# Patient Record
Sex: FEMALE | Race: WHITE | Employment: FULL TIME | URBAN - METROPOLITAN AREA
[De-identification: names, ages, dates, MRNs, and addresses within clinical notes are randomized per-mention and may not be internally consistent; named-entity substitution may affect disease eponyms.]

---

## 2019-04-16 ENCOUNTER — HOSPITAL ENCOUNTER (OUTPATIENT)
Dept: MAMMOGRAPHY | Age: 49
Discharge: HOME | End: 2019-04-16
Attending: FAMILY MEDICINE
Payer: COMMERCIAL

## 2019-04-16 DIAGNOSIS — Z12.31 VISIT FOR SCREENING MAMMOGRAM: ICD-10-CM

## 2019-04-16 PROCEDURE — 77063 BREAST TOMOSYNTHESIS BI: CPT

## 2019-05-29 PROBLEM — N32.81 OAB (OVERACTIVE BLADDER): Status: ACTIVE | Noted: 2019-05-29

## 2019-05-29 PROBLEM — N39.46 MIXED STRESS AND URGE URINARY INCONTINENCE: Status: ACTIVE | Noted: 2019-05-29

## 2019-05-29 PROBLEM — N39.3 SUI (STRESS URINARY INCONTINENCE, FEMALE): Status: ACTIVE | Noted: 2019-05-29

## 2019-09-03 ENCOUNTER — HOSPITAL ENCOUNTER (OUTPATIENT)
Dept: PHYSICAL THERAPY | Age: 49
Discharge: HOME | End: 2019-09-03
Attending: OBSTETRICS & GYNECOLOGY
Payer: COMMERCIAL

## 2019-09-03 DIAGNOSIS — N32.81 OAB (OVERACTIVE BLADDER): ICD-10-CM

## 2019-09-03 DIAGNOSIS — N39.3 SUI (STRESS URINARY INCONTINENCE, FEMALE): ICD-10-CM

## 2019-09-03 PROCEDURE — 97161 PT EVAL LOW COMPLEX 20 MIN: CPT

## 2019-09-03 NOTE — PROGRESS NOTES
Kaveh Sterling  : 1970  Payor: Rhona Medina / Plan[place] [facility] / Product Type: PPO /  [address]  at [facility]  [address] [place] [address]  [phone]   [phone]        OUTPATIENT PHYSICAL THERAPY: Daily Treatment Note 9/3/2019  Visit Count:  1      Pre-treatment Symptoms/Complaints:  Pt is interested in learning how PT can help her symptoms. Pain: Initial: Pain Intensity 1: 0 /10 Post Session:  0/10   Medications Last Reviewed:  9/3/2019  Updated Objective Findings:  See evaluation note from today  TREATMENT:       THERAPEUTIC EXERCISE: (6 minutes):  Exercises per grid below to improve mobility, strength and coordination. Required moderate visual, verbal and tactile cues to promote proper performance. Progressed resistance, range and repetitions as indicated. Date:  9/3/2019     Activity/Exercise Parameters   HEP E/C of the Core with Breathing x 10-15; 3 x daily   Patient Education Role of Core, Management of IAP                         Treatment/Session Summary:    · Response to Treatment:  Pt is a good candidate for skilled PT.  · Communication/Consultation:  None today  · Equipment provided today:  None today  · Recommendations/Intent for next treatment session: Next visit will focus on semg, progress/initiate movement based exercises.     Total Treatment Billable Duration:  6 minutes  PT Patient Time In/Time Out  Time In:   Time Out: [facility] [address], PT    Future Appointments   Date Time Provider Estefany Ferro   2019  9:30 AM Maki Richmond, PT [facility]   2019  9:30 AM Beth Guevara, PT [facility]   10/1/2019  9:30 AM Maki Richmond, PT [facility]   10/8/2019  9:30 AM Maki Richmond, PT [facility]   10/15/2019  9:30 AM Wes MOSER PT [facility]   10/28/2019  3:00 PM Radha Elder DO [facility]   2019  7:30 AM HTF LAB RESOURCE [facility]   2020  8:30 AM F LAB RESOURCE [facility] HTF HTF   8/26/2020  1:15 PM Ewa Rodriguez MD [facility] HTF HTF

## 2019-09-03 NOTE — THERAPY EVALUATION
Bing Harper  : 1970  Payor: Mirian Lee / Plan: [facility] / Product Type: PPO /  [id number]  at [facility]  [address] [place] [address]  [phone]   [phone]          OUTPATIENT PHYSICAL THERAPY:Initial Assessment 9/3/2019   ICD-10: Treatment Diagnosis: lack of coordination (R27.8); generalized weakness (M62.81); stress urinary incontinence (TREVA) (N39.3)  Precautions/Allergies:   Penicillins and Sulfa (sulfonamide antibiotics)   TREATMENT PLAN:  Effective Dates: 9/3/2019 TO 2019 (60 days). Frequency/Duration: 1 time a week for 60 Day(s) MEDICAL/REFERRING DIAGNOSIS:  OAB (overactive bladder) [N32.81]  TREVA (stress urinary incontinence, female) [N39.3]   DATE OF ONSET: Chronic, 3 vaginal deliveries (25, 21 and 18 yo)  REFERRING PHYSICIAN: Stephanie Lopez DO  MD Orders: Eval and Treat  Return MD Appointment: TBD     INITIAL ASSESSMENT:  Ms. Lul Giron demonstrates an uncoordinated PFM group, as well as a lack of coordination and awareness of core expansion/compression with breathing. With maximal verbal instruction and SEMG, this improved and patient able to demonstrate N core activation and appropriate PFM contraction with breathing to decrease IAP resulting in UI. She will benefit from skilled PT with an emphasis on education, biofeedback, muscle retraining and manual therapy to improve coordination and timing and decrease UI with ADL's and recreation. PROBLEM LIST (Impacting functional limitations):  1. Decreased Strength  2. Decreased ADL/Functional Activities  3. Decreased Activity Tolerance  4. Decreased [place] with Home Exercise Program INTERVENTIONS PLANNED: (Treatment may consist of any combination of the following)  1. Electrical Stimulation  2. Home Exercise Program (HEP)  3. Manual Therapy  4. Therapeutic Exercise/Strengthening  5.  Transcutaneous Electrical Nerve Stimulation (TENS)     GOALS: (Goals have been discussed and agreed upon with patient.)  Short-Term Functional Goals: Time Frame: 30 days  Pt will demonstrate I with basic PFM HEP to improve awareness, coordination, and timing of PFM. Pt will increase water intake by 16 oz and decrease irritant consumption by 8 oz to improve bladder health and decrease UI. Discharge Goals: Time Frame: 60 days  1. Pt will demonstrate 10 quick flicks of the pelvic floor muscle group, without compensation, to implement urge suppression appropriately with urgency of urination and decrease the number of pads used per day. 2. Patient will incorporate a voiding schedule and urge strategies into her daily routine to manage urgency and frequency symptoms and decreased urinary frequency to 10x/day or less. 3. Patient will demonstrate N expansion and compression of the core with breathing to decrease urinary incontinence with physical activity. OUTCOME MEASURE:   Urinary Incontinence IMpact Questionaire: 38% Impaired  MEDICAL NECESSITY:   · Patient is expected to demonstrate progress in strength, coordination and functional technique to decrease UI with ADLs and recreation. REASON FOR SERVICES/OTHER COMMENTS:  · Patient continues to require skilled intervention due to below mentioned deficits. Total Duration:  PT Patient Time In/Time Out  Time In: 0937  Time Out: 1032    Rehabilitation Potential For Stated Goals: Good       PAIN/SUBJECTIVE:   Initial: Pain Intensity 1: 0 /10 Post Session:  0/10   HISTORY:   History of Injury/Illness (Reason for Referral):  Urinary: Daily TREVA and UUI, resulting in 1-2 PPD (depending of if she chooses to exercise or not). Cannot run, perform plyometric exercises or jump without immediate TREVA. Saturated pad with activity. Fluid intake: 2 cups of coffee, 20-32 oz H2O x 2, alcohol on Friday evenings. Bowel: Generally only having a BM every few days, no straining, but stool is dehydrated and hard. Sexual: No pain reported.   Pelvic Organ Prolapse/Pelvic Pain: denies POP or pelvic pain    Past Medical History/Comorbidities:   Ms. Ezzie Soulier  has a past medical history of Constipation, Hypercholesterolemia, and Migraine. Ms. Ezzie Soulier  has a past surgical history that includes hx colonoscopy (2012) and hx gyn. Social History/Living Environment:     Lives with  and 2 of her sons  Prior Level of Function/Work/Activity:  Works from Home at the [employer]     Ambulatory/Rehab [address] [place] [address] Risk Assessment   Risk Factors:       No Risk Factors Identified Ability to Rise from Chair:       (0)  Ability to rise in a single movement   Falls Prevention Plan:       No modifications necessary   Total: (5 or greater = High Risk): 0   ©2010 [facility] [employer] [facility] States Patent #[id number]. Federal Law prohibits the replication, distribution or use without written permission from [facility] [employer]   Current Medications:       Current Outpatient Medications:     trospium (SANCTURA XL) 60 mg capsule, Take 1 Cap by mouth Daily (before breakfast). , Disp: 90 Cap, Rfl: 3    pseudoephedrine (SUDAFED) 30 mg tablet, Take  by mouth every four (4) hours as needed for Congestion. , Disp: , Rfl:     loratadine (CLARITIN) 10 mg tablet, Take 10 mg by mouth., Disp: , Rfl:    Date Last Reviewed:  9/3/2019     Number of Personal Factors/Comorbidities that affect the Plan of Care: 0: LOW COMPLEXITY   EXAMINATION:   Palpation:    Via vaginal canal: Nontender superficial PFM    Selective Functional Movement Assessment (Top Tier): FN- Functional Non-painful, FP- Functional Painful, DN- Dysfunctional Non-Painful, DP- Dysfunctional Painful  Multi-Segmental Flexion: FN  Multi-Segmental Extension: DN  Multi-Segmental Rotation: L- FN R- FN  Single Leg Stance: FN, FN    Strength:    PFM via vaginal canal:  P: Power, E: Endurance, R: Repetitions, QF: Quick Flicks, TrA: Transverse Abdominus, DB: Diaphragmatic Breathing  P 4/5   E Limited due to breath holding, but 3 seconds with exhalation   R 5   QF Unable to coordinate   TrA Fair with strong PFM co contraction; but breath holding noted. DB Impaired, chest breathing     PFM via SEMG: Resting tone 1.25-1.5 mV. Contraction > 50 mV    Gluteus Medius: 3+/5 (TFL Compensation)      Body Structures Involved:  1. Muscles Body Functions Affected:  1. Genitourinary  2. Neuromusculoskeletal  3. Movement Related Activities and Participation Affected:  1. General Tasks and Demands  2. Mobility  3.  Self Care   Number of elements (examined above) that affect the Plan of Care: 1-2: LOW COMPLEXITY   CLINICAL PRESENTATION:   Presentation: Stable and uncomplicated: LOW COMPLEXITY   CLINICAL DECISION MAKING:   Use of outcome tool(s) and clinical judgement create a POC that gives a: Clear prediction of patient's progress: LOW COMPLEXITY

## 2019-09-16 ENCOUNTER — HOSPITAL ENCOUNTER (OUTPATIENT)
Dept: PHYSICAL THERAPY | Age: 49
Discharge: HOME | End: 2019-09-16
Attending: OBSTETRICS & GYNECOLOGY
Payer: COMMERCIAL

## 2019-09-16 PROCEDURE — 97110 THERAPEUTIC EXERCISES: CPT

## 2019-09-16 NOTE — PROGRESS NOTES
Manda Dandy  : 1970  Payor: Pheobe Cabot / Plan: [facility] / Product Type: PPO /  [address]  at [facility]  [address] [place] [address]  [phone]   [phone]        OUTPATIENT PHYSICAL THERAPY: Daily Treatment Note 2019  Visit Count:  2      Pre-treatment Symptoms/Complaints: UI is getting better. Significant difficulty doing the breathing    Pain: Initial: Pain Intensity 1: 0 /10 Post Session:  0/10   Medications Last Reviewed:  2019  Updated Objective Findings:  None Today  TREATMENT:       THERAPEUTIC EXERCISE: (45 minutes):  Exercises per grid below to improve mobility, strength and coordination. Required moderate visual, verbal and tactile cues to promote proper performance. Progressed resistance, range and repetitions as indicated. Date:  2019     Activity/Exercise Parameters   HEP E/C of the Core with Breathing x 10-15; 3 x daily  Clam/SLR 1 x 10; 1 x daily   Patient Education Urge suppression, role of core   TrA/PFM Co contraction With SEMG; max instruction for TrA    Kegels Quick Flicks (urge suppression)   SLR With TrA/PFM CO contraction   Bridge With TrA/PFM CO contraction   Sidelying Clamshell With TrA/PFM CO contraction     Treatment/Session Summary:    Response to Treatment: Pt not seen as initially outlined in PT POC due to scheduling conflicts.  signficant difficulty pairing breathing and TrA contraction. slightly improved by end of sessions. TrA is clearly the weaknest link in her kinetic chain, and improving activation of TrA will decrease UI.   · Communication/Consultation:  None today  · Equipment provided today:  None today  · Recommendations/Intent for next treatment session: Next visit will focus on semg, abs/PFM, sitting and standing    Total Treatment Billable Duration:  45 minutes  PT Patient Time In/Time Out  Time In: 935  Time Out: [address], PT    Future Appointments   Date Time Provider Estefany Ferro   9/24/2019  9:30 AM Rodolfo MOSER, PT [facility]   10/1/2019  9:30 AM Jairo Dominique, PT [facility]   10/8/2019  9:30 AM Ilana Hightower, PT [facility]   10/15/2019  9:30 AM Ilana Guevara, PT [facility]   10/28/2019  3:00 PM Tristan Dupont DO [facility]   12/19/2019  7:30 AM HTF LAB RESOURCE [facility] HT[facility]   8/21/2020  8:30 AM HTF LAB RESOURCE [facility] HTF   8/26/2020  1:15 PM Giuliano Rodriguez MD [facility]F HTF

## 2019-09-24 ENCOUNTER — HOSPITAL ENCOUNTER (OUTPATIENT)
Dept: PHYSICAL THERAPY | Age: 49
Discharge: HOME | End: 2019-09-24
Attending: OBSTETRICS & GYNECOLOGY
Payer: COMMERCIAL

## 2019-09-24 PROCEDURE — 97110 THERAPEUTIC EXERCISES: CPT

## 2019-09-24 NOTE — PROGRESS NOTES
Jimmy Morales  : 1970  Payor: Irasema Bray / [place] [facility] / Product Type: PPO /  [address]  at [facility]  [address] [place] [address]  [phone]   [phone]        OUTPATIENT PHYSICAL THERAPY: Daily Treatment Note 2019  Visit Count:  3      Pre-treatment Symptoms/Complaints: UI is still improving. Able to lightly walk fast/jog with heavy feet and no UI. Also, urge suppression seems to be helping as well     Pain: Initial: Pain Intensity 1: 0 /10 Post Session:  0/10   Medications Last Reviewed:  2019  Updated Objective Findings:  None Today  TREATMENT:       THERAPEUTIC EXERCISE: (44 minutes):  Exercises per grid below to improve mobility, strength and coordination. Required moderate visual, verbal and tactile cues to promote proper performance. Progressed resistance, range and repetitions as indicated. Date:  2019     Activity/Exercise Parameters   HEP E/C of the Core with Breathing x 10-15; 3 x daily  Clam/SLR 1 x 10; 1 x daily  Bridge + March, Side plank + clam, LE Lowering, SL Hip Abd/ext/heel raise   Patient Education Running progression   TrA/PFM Co contraction With SEMG; mod instruction for TrA    Nahidgels Quick Flicks (urge suppression); supine, sidelying and SLS   LE Lowering With TrA/PFM CO contraction   Bridge + Marching With TrA/PFM CO contraction   Plank + Sidelying Clamshell With TrA/PFM CO contraction   SL heel Raise, hip abduction, extension With TrA/PFM CO contraction             Treatment/Session Summary:    Response to Treatment:  Progressing very well with activation of tra in various positions. Emphasis today on modifications/progressions of HPE and initiating standing core/ return to running with proper alignment.    · Communication/Consultation:  None today  · Equipment provided today:  None today  · Recommendations/Intent for next treatment session: Next visit will focus on squats, lunges, lateral georges, resisted SLS rotation    Total Treatment Billable Duration:  44 minutes  PT Patient Time In/Time Out  Time In: 0935  Time Out: [address], PT    Future Appointments   Date Time Provider Estefany Ferro   10/1/2019  9:30 AM Kar MOSER, PT [facility]   10/8/2019  9:30 AM Nestor Antonio, PT [facility]   10/15/2019  9:30 AM Kar MOSER, PT [facility]   10/28/2019  3:00 PM Molly Woodall DO [facility]   12/19/2019  7:30 AM HTF LAB RESOURCE [facility]   8/21/2020  8:30 AM HTF LAB RESOURCE [facility] HT[facility]   8/26/2020  1:15 PM Jenna Rodriguez MD [facility] HTF

## 2019-10-01 ENCOUNTER — HOSPITAL ENCOUNTER (OUTPATIENT)
Dept: PHYSICAL THERAPY | Age: 49
Discharge: HOME | End: 2019-10-01
Attending: OBSTETRICS & GYNECOLOGY
Payer: COMMERCIAL

## 2019-10-01 PROCEDURE — 97110 THERAPEUTIC EXERCISES: CPT

## 2019-10-01 NOTE — PROGRESS NOTES
Donna Ancelmo  : 1970  Payor: Lakeisha Bhandari / [place] [facility] / Product Type: PPO /  [address]  at [facility]  [address] [place] [address]  [phone]   [phone]        OUTPATIENT PHYSICAL THERAPY: Daily Treatment Note 10/1/2019  Visit Count:  4      Pre-treatment Symptoms/Complaints: UI keeps getting better. Felt like she was going to leak the whole time she was on the TM yesterday, but didn't. Tweaked her R LB a little while doing exercises at home. Pain: Initial: Pain Intensity 1: 0 /10 Post Session:  0/10   Medications Last Reviewed:  10/1/2019  Updated Objective Findings:  None Today  TREATMENT:       THERAPEUTIC EXERCISE: (43 minutes):  Exercises per grid below to improve mobility, strength and coordination. Required moderate visual, verbal and tactile cues to promote proper performance. Progressed resistance, range and repetitions as indicated. Date:  10/1/2019     Activity/Exercise Parameters   HEP E/C of the Core with Breathing x 10-15; 3 x daily  Clam/SLR 1 x 10; 1 x daily  Bridge + March, Side plank + clam, LE Lowering, SL Hip Abd/ext/heel raise   Patient Education Running progression   TrA/PFM Co contraction With SEMG; mod instruction for TrA    LE Lowering X 1 minute; 1.5# ankle weight B   SLR, HIp Abd,Reverse Clam X 1 minute; 1.5# ankle weight B; ALL   Bridge in ER X 1 minute; 1.5# ankle weight B   Quadruped Hip Extension 9 #; 2 x 10   SL heel Raise, hip abduction, extension With TrA/PFM CO contraction; 1.5# ankle weight   Side stepping with Blue TB 10\" x 6 B   Mini Squats, f/b Sumo Squats X 20   Lateral Bounding X 20 each   Jump into hip abd X 10     Treatment/Session Summary:    Response to Treatment:  No UI with any activities. Difficulty activating glue max/med, but improved by end of session. Initiated plyometrics for HEP tor return to running.   · Communication/Consultation:  None today  · Equipment provided today: None today  · Recommendations/Intent for next treatment session: Next visit will focus on , resisted lateral bounding, resisted SLS rotation    Total Treatment Billable Duration:  43inutes  PT Patient Time In/Time Out  Time In: 0935  Time Out: [address] [address],     Future Appointments   Date Time Provider Estefany Ferro   10/8/2019  9:30 AM Jessica Ward, PT [facility]   10/15/2019  9:30 AM Jodee MOSER PT [facility]   10/28/2019  3:00 PM Leonela Morrow DO [facility]   12/19/2019  7:30 AM HTF LAB RESOURCE [facility]   8/21/2020  8:30 AM HTF LAB RESOURCE [facility]   8/26/2020  1:15 PM Elder Stokes MD [facility] HTF

## 2019-10-08 ENCOUNTER — HOSPITAL ENCOUNTER (OUTPATIENT)
Dept: PHYSICAL THERAPY | Age: 49
Discharge: HOME | End: 2019-10-08
Attending: OBSTETRICS & GYNECOLOGY
Payer: COMMERCIAL

## 2019-10-08 PROCEDURE — 97110 THERAPEUTIC EXERCISES: CPT

## 2019-10-08 NOTE — PROGRESS NOTES
Caleb Mir  : 1970  Payor: Estevan Guevara / [place] [facility] / Product Type: PPO /  [address]  at [facility]  [address] [place] [address]  [phone]   [phone]        OUTPATIENT PHYSICAL THERAPY: Daily Treatment Note 10/8/2019  Visit Count:  5      Pre-treatment Symptoms/Complaints: Bladder is good. Bowels are fine. Haven't had a lot of time to exercise, like plyometrics, but no UI this am with two jumping jacks. Pain: Initial: Pain Intensity 1: 0 /10 Post Session:  0/10   Medications Last Reviewed:  10/8/2019  Updated Objective Findings:  None Today  TREATMENT:       THERAPEUTIC EXERCISE: (45 minutes):  Exercises per grid below to improve mobility, strength and coordination. Required moderate visual, verbal and tactile cues to promote proper performance. Progressed resistance, range and repetitions as indicated. Date:  10/8/2019     Activity/Exercise Parameters   HEP E/C of the Core with Breathing x 10-15; 3 x daily  Clam/SLR 1 x 10; 1 x daily  Bridge + March, Side plank + clam, LE Lowering, SL Hip Abd/ext/heel raise   Patient Education    TrA/PFM Co contraction    LE Lowering X 1 minute; 1.5# ankle weight B   SLR, HIp Abd,Reverse Clam X 1 minute; 1.5# ankle weight B; ALL   Bridge in ER X 1 minute; 1.5# ankle weight B       6\" Step up Green Resistance; 2 minutes B all directions   Resisted Bounding Fwd and Lateral ; 2 minutes all directions B   Mini Squats, f/b Sumo Squats X 20   Lateral Bounding X 20 each   Jump into hip abd X 10     Treatment/Session Summary:    Response to Treatment:  No UI with any activities. Difficulty performing dynamic tasks at first, but improved with cuing. Progressing very well at this time. · Communication/Consultation:  None today  · Equipment provided today:  None today  · Recommendations/Intent for next treatment session: Next visit will focus on Rowing, Sled Push, WAll ball, jog?     Total Treatment Billable Duration:  45 minutes  PT Patient Time In/Time Out  Time In: 0930  Time Out: [address], PT    Future Appointments   Date Time Provider Estefany Kasie   10/15/2019  9:30 AM Ferdinand Mackay PT [facility]   10/28/2019  3:00 PM Basim Franz DO [facility]   12/19/2019  7:30 AM HTF LAB RESOURCE [facility]   8/21/2020  8:30 AM HTF LAB RESOURCE [facility]   8/26/2020  1:15 PM Princess Jairo Rodriguez MD [facility] HTF

## 2019-10-15 ENCOUNTER — HOSPITAL ENCOUNTER (OUTPATIENT)
Dept: PHYSICAL THERAPY | Age: 49
Discharge: HOME | End: 2019-10-15
Attending: OBSTETRICS & GYNECOLOGY
Payer: COMMERCIAL

## 2019-10-15 PROCEDURE — 97110 THERAPEUTIC EXERCISES: CPT

## 2019-10-15 NOTE — PROGRESS NOTES
Nikkie Derek  : 1970  Payor: Aaron Randall / [place] [facility] / Product Type: PPO /  [address] at [facility]  [address] [place] [address]  [phone]   [phone]        OUTPATIENT PHYSICAL THERAPY: Daily Treatment Note 10/15/2019  Visit Count:  6      Pre-treatment Symptoms/Complaints: Needs to [facility] harder\" and perform exercise consistently. But overall bladder is still getting better. m    Pain: Initial: Pain Intensity 1: 0 /10 Post Session:  0/10   Medications Last Reviewed:  10/15/2019  Updated Objective Findings:  None Today  TREATMENT:       THERAPEUTIC EXERCISE: (45 minutes):  Exercises per grid below to improve mobility, strength and coordination. Required moderate visual, verbal and tactile cues to promote proper performance. Progressed resistance, range and repetitions as indicated. Date:  10/15/2019     Activity/Exercise Parameters   HEP E/C of the Core with Breathing x 10-15; 3 x daily  Clam/SLR 1 x 10; 1 x daily  Bridge + March, Side plank + clam, LE Lowering, SL Hip Abd/ext/heel raise   Patient Education    TrA/PFM Co contraction    LE Lowering    SLR, HIp Abd,Reverse Clam X 1 minute; green TB B; ALL   Bridge in ER X 1 minute; green TB   Sled Push 75\" x 6   Lunges 10#; 3 x 10 B   Wall Squats 10#; 2 x 10   Jogging, Shuffling 10 feet; x 3 B   Rower 4 minutes, 2 minutes (level 10         Treatment/Session Summary:    Response to Treatment:  No UI with any activities. Lack of coordination noted and hip weakness with SL tasks. Tried light jogging and shuffling with no UI.     · Communication/Consultation:  None today  · Equipment provided today:  None today  · Recommendations/Intent for next treatment session: Next visit will focus on revisit SEMG, BOSU tasks    Total Treatment Billable Duration:  45 minutes  PT Patient Time In/Time Out  Time In: 930  Time Out: 0821 Jack Alberto, PT    Future Appointments   Date Time Provider Estefany Ferro   10/28/2019  3:00 PM Gm Quijano DO [facility]   11/12/2019  8:00 AM Naye Guevara, [facility]   12/19/2019  7:30 AM HTF LAB RESOURCE [facility] HT[facility]   8/21/2020  8:30 AM HTF LAB RESOURCE [facility] HTF   8/26/2020  1:15 PM Cathy Rodriguez MD [facility] HTF HTF

## 2019-11-12 ENCOUNTER — HOSPITAL ENCOUNTER (OUTPATIENT)
Dept: PHYSICAL THERAPY | Age: 49
Discharge: HOME | End: 2019-11-12
Attending: OBSTETRICS & GYNECOLOGY
Payer: COMMERCIAL

## 2019-11-12 PROCEDURE — 97110 THERAPEUTIC EXERCISES: CPT

## 2019-11-12 NOTE — THERAPY DISCHARGE
Uvaldosonali Roberson  : 1970  Payor: Kaden Earing / Plan: [facility] / Product Type: PPO /  [address]  at [address]  [phone]   [phone]          OUTPATIENT PHYSICAL THERAPY:Recertification and Discharge Summary 2019   ICD-10: Treatment Diagnosis: lack of coordination (R27.8); generalized weakness (M62.81); stress urinary incontinence (TREVA) (N39.3)  Precautions/Allergies:   Penicillins and Sulfa (sulfonamide antibiotics)   TREATMENT PLAN:  Effective Dates: 2019 TO 2019.  ) MEDICAL/REFERRING DIAGNOSIS:  Pelvic floor dysfunction in female [M62.89]   DATE OF ONSET: Chronic, 3 vaginal deliveries (26, 21 and 18 yo)  REFERRING PHYSICIAN: Jazzy Bello DO  MD Orders: Eval and Treat  Return MD Appointment: TBD     DISCHARGE 2019:  Patient has been seen in skilled PT from 9/3/2019 to 2019, a total of 7 visits. Treatment has emphasized manual therapy, education, and exercise. Patient responded well to therapy, with improvements in UI. She has achieved her goals and all questions have been answered to her satisfaction. INITIAL ASSESSMENT:  Ms. Sailaja Dan demonstrates an uncoordinated PFM group, as well as a lack of coordination and awareness of core expansion/compression with breathing. With maximal verbal instruction and SEMG, this improved and patient able to demonstrate N core activation and appropriate PFM contraction with breathing to decrease IAP resulting in UI. She will benefit from skilled PT with an emphasis on education, biofeedback, muscle retraining and manual therapy to improve coordination and timing and decrease UI with ADL's and recreation. PROBLEM LIST (Impacting functional limitations):  1. Decreased Strength  2. Decreased ADL/Functional Activities  3. Decreased Activity Tolerance  4.  Decreased [place] with Home Exercise Program INTERVENTIONS PLANNED: (Treatment may consist of any combination of the following)  1. Electrical Stimulation  2. Home Exercise Program (HEP)  3. Manual Therapy  4. Therapeutic Exercise/Strengthening  5. Transcutaneous Electrical Nerve Stimulation (TENS)     GOALS: (Goals have been discussed and agreed upon with patient.)  Short-Term Functional Goals: Time Frame: 30 days  Pt will demonstrate I with basic PFM HEP to improve awareness, coordination, and timing of PFM. MET  Pt will increase water intake by 16 oz and decrease irritant consumption by 8 oz to improve bladder health and decrease UI. MET    Discharge Goals: Time Frame: 60 days  1. Pt will demonstrate 10 quick flicks of the pelvic floor muscle group, without compensation, to implement urge suppression appropriately with urgency of urination and decrease the number of pads used per day. MET  2. Patient will incorporate a voiding schedule and urge strategies into her daily routine to manage urgency and frequency symptoms and decreased urinary frequency to 10x/day or less. MET  3. Patient will demonstrate N expansion and compression of the core with breathing to decrease urinary incontinence with physical activity. MET    OUTCOME MEASURE:   Urinary Incontinence IMpact Questionaire: 38% Impaired; 11/12: 24% Impaired    Rehabilitation Potential For Stated Goals: Good       PAIN/SUBJECTIVE:   Initial: Pain Intensity 1: 0 /10 Post Session:  0/10   HISTORY:   History of Injury/Illness (Reason for Referral):  Urinary: Daily TREVA and UUI, resulting in 1-2 PPD (depending of if she chooses to exercise or not). Cannot run, perform plyometric exercises or jump without immediate TREVA. Saturated pad with activity. Fluid intake: 2 cups of coffee, 20-32 oz H2O x 2, alcohol on Friday evenings. Bowel: Generally only having a BM every few days, no straining, but stool is dehydrated and hard. Sexual: No pain reported.   Pelvic Organ Prolapse/Pelvic Pain: denies POP or pelvic pain    DISCHARGE/RECERTIFICATION:  Urinary: UI only occurring with plyometric exercises and it is small. She is wearing a pad with dynamic exercises but no other times. No UUI or UI with cough or sneeze in the past several weeks. Bowel: Same frequency, but stool is more soft recently. Sexual: No pain  Pelvic Organ Prolapse/Pelvic Pain: denies      Past Medical History/Comorbidities:   Ms. Cassidy Pringle  has a past medical history of Constipation, Hypercholesterolemia, and Migraine. Ms. Cassidy Pringle  has a past surgical history that includes hx colonoscopy (2012) and hx gyn. Social History/Living Environment:     Lives with  and 2 of her sons  Prior Level of Function/Work/Activity:  Works from Home at the [employer]     [facility] [address] [place] [address] Risk Assessment   Risk Factors:       No Risk Factors Identified Ability to Rise from Chair:       (0)  Ability to rise in a single movement   Falls Prevention Plan:       No modifications necessary   Total: (5 or greater = High Risk): 0   ©2010 [facility] [employer] [facility] States Patent #[id number]. Federal Law prohibits the replication, distribution or use without written permission from [facility] [employer]   Current Medications:       Current Outpatient Medications:     SUMAtriptan (IMITREX) 100 mg tablet, Take one tab po for migraine. May repeat x1 if ineffective. Max 24-hr dosage 200mg., Disp: 9 Tab, Rfl: 6    trospium (SANCTURA XL) 60 mg capsule, Take 1 Cap by mouth Daily (before breakfast). , Disp: 90 Cap, Rfl: 3    pseudoephedrine (SUDAFED) 30 mg tablet, Take  by mouth every four (4) hours as needed for Congestion. , Disp: , Rfl:     loratadine (CLARITIN) 10 mg tablet, Take 10 mg by mouth., Disp: , Rfl:    Date Last Reviewed:  11/12/2019     Number of Personal Factors/Comorbidities that affect the Plan of Care: 0: LOW COMPLEXITY   EXAMINATION:   UPDATED 11/12/2019:  Palpation:    Via vaginal canal: Nontender superficial PFM    Selective Functional Movement Assessment (Top Tier): FN- Functional Non-painful, FP- Functional Painful, DN- Dysfunctional Non-Painful, DP- Dysfunctional Painful  Multi-Segmental Flexion: FN  Multi-Segmental Extension: DN  Multi-Segmental Rotation: L- FN R- FN  Single Leg Stance: FN, FN    Strength:    PFM via vaginal canal:  P: Power, E: Endurance, R: Repetitions, QF: Quick Flicks, TrA: Transverse Abdominus, DB: Diaphragmatic Breathing  P 4/5   E 30 SECONDS   R 5   QF 2 minutes with N relaxation and same power output   TrA Good with strong PFM co contraction and good endurance; no breath holding   DB WNL     PFM via SEMG: Resting tone 1.25-1.5 mV. Contraction > 50 mV    Gluteus Medius: 4/5     Body Structures Involved:  1. Muscles Body Functions Affected:  1. Genitourinary  2. Neuromusculoskeletal  3. Movement Related Activities and Participation Affected:  1. General Tasks and Demands  2. Mobility  3.  Self Care   Number of elements (examined above) that affect the Plan of Care: 1-2: LOW COMPLEXITY   CLINICAL PRESENTATION:   Presentation: Stable and uncomplicated: LOW COMPLEXITY   CLINICAL DECISION MAKING:   Use of outcome tool(s) and clinical judgement create a POC that gives a: Clear prediction of patient's progress: LOW COMPLEXITY

## 2019-11-12 NOTE — PROGRESS NOTES
Carlo Mahmood  : 1970  Payor: Francisco Llamas / Plan: [facility] / Product Type: PPO /  [address]  at [facility]  [address] [place] [address]  [phone]   [phone]        OUTPATIENT PHYSICAL THERAPY: Daily Treatment Note 2019  Visit Count:  7      Pre-treatment Symptoms/Complaints: See DC summary. Pain: Initial: Pain Intensity 1: 0 /10 Post Session:  0/10   Medications Last Reviewed:  2019  Updated Objective Findings:  See DC summary  TREATMENT:       THERAPEUTIC EXERCISE: (45 minutes):  Exercises per grid below to improve mobility, strength and coordination. Required moderate visual, verbal and tactile cues to promote proper performance. Progressed resistance, range and repetitions as indicated. Date:  2019     Activity/Exercise Parameters   HEP E/C of the Core with Breathing x 10-15; 3 x daily  Clam/SLR 1 x 10; 1 x daily  Bridge + March, Side plank + clam, LE Lowering, SL Hip Abd/ext/heel raise   Patient Education Mait Program, Impressa, Urethral Hypermobility, Modifications for DC planning   TrA/PFM Co contraction Supine with SEMG   Kegel Quick flicks; supine and sidelying   SLR X 30 B   Bridge in ER X 30   LE Lowering 30 B   Lunges 10#; 3 x 10 B   Wall Squats 10#; 2 x 10   Jogging, Shuffling 10 feet; x 3 B   Rower 4 minutes, 2 minutes (level 10         Treatment/Session Summary:    Response to Treatment:  Improvements noted in PFM and Tra function. All questions answered to satisfaction and I with HEP. DC at this time.    · Communication/Consultation:  None today  · Equipment provided today:  None today     Total Treatment Billable Duration:  45 minutes  PT Patient Time In/Time Out  Time In: 0815  Time Out: 0900  Melissa Graves, JEREMY    Future Appointments   Date Time Provider Estefany Ferro   2019  2:00 PM Yordy Morgan DO [facility]   2019  7:30 AM [facility] LAB RESOURCE [facility]   2020  8:30 AM HTF LAB RESOURCE [facility]   8/26/2020  1:15 PM Klever Rodriguez MD [facility]

## 2020-11-16 ENCOUNTER — HOSPITAL ENCOUNTER (OUTPATIENT)
Dept: GENERAL RADIOLOGY | Age: 50
Discharge: HOME | End: 2020-11-16
Attending: FAMILY MEDICINE
Payer: COMMERCIAL

## 2020-11-16 DIAGNOSIS — M54.2 NECK PAIN: ICD-10-CM

## 2020-11-16 PROCEDURE — 72040 X-RAY EXAM NECK SPINE 2-3 VW: CPT

## 2021-06-22 ENCOUNTER — TRANSCRIBE ORDER (OUTPATIENT)
Dept: SCHEDULING | Age: 51
End: 2021-06-22

## 2021-06-22 DIAGNOSIS — Z12.31 VISIT FOR SCREENING MAMMOGRAM: Primary | ICD-10-CM

## 2021-07-22 ENCOUNTER — HOSPITAL ENCOUNTER (OUTPATIENT)
Dept: MAMMOGRAPHY | Age: 51
Discharge: HOME | End: 2021-07-22
Attending: FAMILY MEDICINE
Payer: COMMERCIAL

## 2021-07-22 DIAGNOSIS — Z12.31 VISIT FOR SCREENING MAMMOGRAM: ICD-10-CM

## 2021-07-22 PROCEDURE — 77063 BREAST TOMOSYNTHESIS BI: CPT

## 2022-03-19 PROBLEM — N39.3 SUI (STRESS URINARY INCONTINENCE, FEMALE): Status: ACTIVE | Noted: 2019-05-29

## 2022-03-20 PROBLEM — N32.81 OAB (OVERACTIVE BLADDER): Status: ACTIVE | Noted: 2019-05-29

## 2022-08-29 DIAGNOSIS — Z00.00 ANNUAL PHYSICAL EXAM: Primary | ICD-10-CM

## 2022-08-30 ENCOUNTER — NURSE ONLY (OUTPATIENT)
Dept: FAMILY MEDICINE CLINIC | Facility: CLINIC | Age: 52
End: 2022-08-30

## 2022-08-30 DIAGNOSIS — Z00.00 ANNUAL PHYSICAL EXAM: ICD-10-CM

## 2022-08-30 LAB
ALBUMIN SERPL-MCNC: 3.8 G/DL (ref 3.5–5)
ALBUMIN/GLOB SERPL: 1.2 {RATIO} (ref 1.2–3.5)
ALP SERPL-CCNC: 79 U/L (ref 50–136)
ALT SERPL-CCNC: 19 U/L (ref 12–65)
ANION GAP SERPL CALC-SCNC: 4 MMOL/L (ref 7–16)
AST SERPL-CCNC: 12 U/L (ref 15–37)
BILIRUB SERPL-MCNC: 0.7 MG/DL (ref 0.2–1.1)
BUN SERPL-MCNC: 11 MG/DL (ref 6–23)
CALCIUM SERPL-MCNC: 9.4 MG/DL (ref 8.3–10.4)
CHLORIDE SERPL-SCNC: 107 MMOL/L (ref 98–107)
CHOLEST SERPL-MCNC: 241 MG/DL
CO2 SERPL-SCNC: 28 MMOL/L (ref 21–32)
CREAT SERPL-MCNC: 0.7 MG/DL (ref 0.6–1)
GLOBULIN SER CALC-MCNC: 3.3 G/DL (ref 2.3–3.5)
GLUCOSE SERPL-MCNC: 89 MG/DL (ref 65–100)
HDLC SERPL-MCNC: 54 MG/DL (ref 40–60)
HDLC SERPL: 4.5 {RATIO}
LDLC SERPL CALC-MCNC: 156.4 MG/DL
POTASSIUM SERPL-SCNC: 4 MMOL/L (ref 3.5–5.1)
PROT SERPL-MCNC: 7.1 G/DL (ref 6.3–8.2)
SODIUM SERPL-SCNC: 139 MMOL/L (ref 136–145)
TRIGL SERPL-MCNC: 153 MG/DL (ref 35–150)
TSH, 3RD GENERATION: 3.17 UIU/ML (ref 0.36–3.74)
VLDLC SERPL CALC-MCNC: 30.6 MG/DL (ref 6–23)

## 2022-08-31 LAB
APPEARANCE UR: CLEAR
BACTERIA URNS QL MICRO: NEGATIVE /HPF
BASOPHILS # BLD: 0 K/UL (ref 0–0.2)
BASOPHILS NFR BLD: 1 % (ref 0–2)
BILIRUB UR QL: NEGATIVE
CASTS URNS QL MICRO: ABNORMAL /LPF
COLOR UR: ABNORMAL
DIFFERENTIAL METHOD BLD: ABNORMAL
EOSINOPHIL # BLD: 0.1 K/UL (ref 0–0.8)
EOSINOPHIL NFR BLD: 2 % (ref 0.5–7.8)
EPI CELLS #/AREA URNS HPF: ABNORMAL /HPF
ERYTHROCYTE [DISTWIDTH] IN BLOOD BY AUTOMATED COUNT: 13.2 % (ref 11.9–14.6)
GLUCOSE UR STRIP.AUTO-MCNC: NEGATIVE MG/DL
HCT VFR BLD AUTO: 42.9 % (ref 35.8–46.3)
HGB BLD-MCNC: 13.3 G/DL (ref 11.7–15.4)
HGB UR QL STRIP: NEGATIVE
IMM GRANULOCYTES # BLD AUTO: 0 K/UL (ref 0–0.5)
IMM GRANULOCYTES NFR BLD AUTO: 0 % (ref 0–5)
KETONES UR QL STRIP.AUTO: NEGATIVE MG/DL
LEUKOCYTE ESTERASE UR QL STRIP.AUTO: NEGATIVE
LYMPHOCYTES # BLD: 1.8 K/UL (ref 0.5–4.6)
LYMPHOCYTES NFR BLD: 34 % (ref 13–44)
MCH RBC QN AUTO: 30.4 PG (ref 26.1–32.9)
MCHC RBC AUTO-ENTMCNC: 31 G/DL (ref 31.4–35)
MCV RBC AUTO: 97.9 FL (ref 79.6–97.8)
MONOCYTES # BLD: 0.5 K/UL (ref 0.1–1.3)
MONOCYTES NFR BLD: 10 % (ref 4–12)
MUCOUS THREADS URNS QL MICRO: 0 /LPF
NEUTS SEG # BLD: 2.7 K/UL (ref 1.7–8.2)
NEUTS SEG NFR BLD: 53 % (ref 43–78)
NITRITE UR QL STRIP.AUTO: NEGATIVE
NRBC # BLD: 0 K/UL (ref 0–0.2)
PH UR STRIP: 7 [PH] (ref 5–9)
PLATELET # BLD AUTO: 238 K/UL (ref 150–450)
PMV BLD AUTO: 12.3 FL (ref 9.4–12.3)
PROT UR STRIP-MCNC: NEGATIVE MG/DL
RBC # BLD AUTO: 4.38 M/UL (ref 4.05–5.2)
RBC #/AREA URNS HPF: ABNORMAL /HPF
SP GR UR REFRACTOMETRY: <1.005 (ref 1–1.02)
URINE CULTURE IF INDICATED: ABNORMAL
UROBILINOGEN UR QL STRIP.AUTO: 0.2 EU/DL (ref 0.2–1)
WBC # BLD AUTO: 5.2 K/UL (ref 4.3–11.1)
WBC URNS QL MICRO: ABNORMAL /HPF

## 2022-09-06 ENCOUNTER — OFFICE VISIT (OUTPATIENT)
Dept: FAMILY MEDICINE CLINIC | Facility: CLINIC | Age: 52
End: 2022-09-06
Payer: COMMERCIAL

## 2022-09-06 VITALS
SYSTOLIC BLOOD PRESSURE: 110 MMHG | OXYGEN SATURATION: 98 % | HEART RATE: 78 BPM | BODY MASS INDEX: 25.27 KG/M2 | HEIGHT: 64 IN | DIASTOLIC BLOOD PRESSURE: 68 MMHG | WEIGHT: 148 LBS | TEMPERATURE: 97 F

## 2022-09-06 DIAGNOSIS — E78.2 MIXED HYPERLIPIDEMIA: ICD-10-CM

## 2022-09-06 DIAGNOSIS — Z00.00 ANNUAL PHYSICAL EXAM: Primary | ICD-10-CM

## 2022-09-06 DIAGNOSIS — Z12.31 SCREENING MAMMOGRAM, ENCOUNTER FOR: ICD-10-CM

## 2022-09-06 DIAGNOSIS — N32.81 OAB (OVERACTIVE BLADDER): ICD-10-CM

## 2022-09-06 DIAGNOSIS — Z12.11 SCREENING FOR COLON CANCER: ICD-10-CM

## 2022-09-06 DIAGNOSIS — G43.009 MIGRAINE WITHOUT AURA AND WITHOUT STATUS MIGRAINOSUS, NOT INTRACTABLE: ICD-10-CM

## 2022-09-06 PROCEDURE — 99396 PREV VISIT EST AGE 40-64: CPT | Performed by: FAMILY MEDICINE

## 2022-09-06 RX ORDER — TROSPIUM CHLORIDE 20 MG/1
20 TABLET, FILM COATED ORAL 2 TIMES DAILY
Qty: 60 TABLET | Refills: 12 | Status: SHIPPED | OUTPATIENT
Start: 2022-09-06

## 2022-09-06 RX ORDER — SUMATRIPTAN 100 MG/1
100 TABLET, FILM COATED ORAL
Qty: 9 TABLET | Refills: 5 | Status: SHIPPED | OUTPATIENT
Start: 2022-09-06 | End: 2022-09-06

## 2022-09-06 RX ORDER — ROSUVASTATIN CALCIUM 10 MG/1
10 TABLET, COATED ORAL NIGHTLY
Qty: 30 TABLET | Refills: 3 | Status: SHIPPED | OUTPATIENT
Start: 2022-09-06

## 2022-09-06 ASSESSMENT — ENCOUNTER SYMPTOMS
DIARRHEA: 0
CONSTIPATION: 0
ABDOMINAL PAIN: 0
CHEST TIGHTNESS: 0
COUGH: 0
SHORTNESS OF BREATH: 0
SINUS PAIN: 0
EYE DISCHARGE: 0

## 2022-09-06 NOTE — PROGRESS NOTES
Florence Butler is a 47 y.o. female who presents with   Chief Complaint   Patient presents with    Annual Exam       History of Present Illness  Pt for cpx. Migraine 4-5 days per month. Imitrex helps. Stress at work, but overall mood okay. Exercising fairly regularly- walking dog. No cp or sob. No swelling or palpitations. No GI sxs. Urinary sxs controlled with [place]. No periods x over 1 year. . FH mother with hyperlipidemia and MGM with MI in [de-identified]. Review of Systems  Review of Systems   Constitutional:  Negative for appetite change, fatigue and fever. HENT:  Negative for congestion, ear pain and sinus pain. Eyes:  Negative for discharge. Respiratory:  Negative for cough, chest tightness and shortness of breath. Cardiovascular:  Negative for chest pain, palpitations and leg swelling. Gastrointestinal:  Negative for abdominal pain, constipation and diarrhea. Genitourinary:  Negative for dysuria. Musculoskeletal:  Negative for joint swelling. Skin:  Negative for rash. Neurological:  Positive for headaches. Hematological:  Negative for adenopathy. Psychiatric/Behavioral:  Negative for dysphoric mood. The patient is not nervous/anxious. Medications  Current Outpatient Medications   Medication Sig Dispense Refill    trospium (SANCTURA) 20 MG tablet Take 1 tablet by mouth 2 times daily 60 tablet 12    SUMAtriptan (IMITREX) 100 MG tablet Take 1 tablet by mouth once as needed for Migraine Take one tab po for migraine. May repeat x1 if ineffective. Max 24-hr dosage 200mg.  9 tablet 5    rosuvastatin (CRESTOR) 10 MG tablet Take 1 tablet by mouth nightly 30 tablet 3    loratadine (CLARITIN) 10 MG tablet Take 10 mg by mouth      pseudoephedrine (SUDAFED) 30 MG tablet Take by mouth every 4 hours as needed      clotrimazole-betamethasone (LOTRISONE) 1-0.05 % cream Apply topically 2 times daily (Patient not taking: Reported on 9/6/2022)      terbinafine (LAMISIL) 250 MG tablet Take 250 mg by mouth daily (Patient not taking: Reported on 9/6/2022)      trospium (SANCTURA) 60 MG CP24 extended release capsule Take 60 mg by mouth every morning (before breakfast) (Patient not taking: Reported on 9/6/2022)       No current facility-administered medications for this visit.         Past Medical History  Past Medical History:   Diagnosis Date    Constipation     Hypercholesterolemia     Migraine        Surgical History  Past Surgical History:   Procedure Laterality Date    COLONOSCOPY  2012    GYN      D&C after miscarriage          Family History  Family History   Problem Relation Age of Onset    Breast Cancer Maternal Grandmother         62s    Cancer Father         skin    Asthma Sister     Bipolar Disorder Brother     No Known Problems Brother     Bleeding Prob Maternal Grandmother         breast    Osteoarthritis Mother     Elevated Lipids Mother     No Known Problems Brother        Social History  Social History     Socioeconomic History    Marital status:      Spouse name: Not on file    Number of children: Not on file    Years of education: Not on file    Highest education level: Not on file   Occupational History    Not on file   Tobacco Use    Smoking status: Former    Smokeless tobacco: Never   Substance and Sexual Activity    Alcohol use: Yes    Drug use: No    Sexual activity: Not on file   Other Topics Concern    Not on file   Social History Narrative    Not on file     Social Determinants of Health     Financial Resource Strain: Not on file   Food Insecurity: Not on file   Transportation Needs: Not on file   Physical Activity: Not on file   Stress: Not on file   Social Connections: Not on file   Intimate Partner Violence: Not on file   Housing Stability: Not on file       Social History     Tobacco Use   Smoking Status Former   Smokeless Tobacco Never       Allergies  Allergies   Allergen Reactions    Penicillins Rash    Sulfa Antibiotics Rash       Vital Signs  Body mass index is 25.4 kg/m². Vitals:    09/06/22 0759   BP: 110/68   Pulse: 78   Temp: 97 °F (36.1 °C)   TempSrc: Temporal   SpO2: 98%   Weight: 148 lb (67.1 kg)   Height: 5' 4\" (1.626 m)       Physical Exam  Physical Exam  Vitals reviewed. Constitutional:       Appearance: Normal appearance. HENT:      Head: Normocephalic. Right Ear: Tympanic membrane normal.      Left Ear: Tympanic membrane normal.      Nose: No congestion. Mouth/Throat:      Pharynx: No oropharyngeal exudate or posterior oropharyngeal erythema. Eyes:      Extraocular Movements: Extraocular movements intact. Conjunctiva/sclera: Conjunctivae normal.      Pupils: Pupils are equal, round, and reactive to light. Neck:      Vascular: No carotid bruit. Cardiovascular:      Rate and Rhythm: Normal rate and regular rhythm. Pulses: Normal pulses. Heart sounds: No murmur heard. Pulmonary:      Effort: Pulmonary effort is normal.      Breath sounds: Normal breath sounds. No wheezing. Chest:   Breasts:     Breasts are symmetrical.      Right: No swelling, bleeding, inverted nipple, mass, nipple discharge, skin change or tenderness. Left: No swelling, bleeding, inverted nipple, mass, nipple discharge, skin change or tenderness. Abdominal:      General: Bowel sounds are normal. There is no distension. Palpations: Abdomen is soft. There is no mass. Tenderness: There is no abdominal tenderness. Hernia: No hernia is present. Genitourinary:     General: Normal vulva. Vagina: No vaginal discharge. Musculoskeletal:         General: No tenderness. Cervical back: Normal range of motion. Right lower leg: No edema. Left lower leg: No edema. Lymphadenopathy:      Cervical: No cervical adenopathy. Skin:     General: Skin is warm and dry. Findings: No rash. Neurological:      General: No focal deficit present. Mental Status: She is alert and oriented to person, place, and time. Psychiatric:         Mood and Affect: Mood normal.         Thought Content: Thought content normal.        Assessment and Plan  Lisa TURNER was seen today for annual exam.    Diagnoses and all orders for this visit:    Annual physical exam    Migraine without aura and without status migrainosus, not intractable  -     SUMAtriptan (IMITREX) 100 MG tablet; Take 1 tablet by mouth once as needed for Migraine Take one tab po for migraine. May repeat x1 if ineffective. Max 24-hr dosage 200mg. OAB (overactive bladder)  -     trospium (SANCTURA) 20 MG tablet; Take 1 tablet by mouth 2 times daily    Mixed hyperlipidemia  -     rosuvastatin (CRESTOR) 10 MG tablet; Take 1 tablet by mouth nightly  -     Lipid Panel; Future  -     Hepatic Function Panel; Future    Screening for colon cancer  -     [facility]    Screening mammogram, encounter for  -     [facility]ORENA DIGITAL SCREEN BILATERAL;  Future  Add Crestor  Recheck lipids and LFTs in 6 weeks  Diet/exercise  Mammogram/ colonoscopy

## 2022-09-23 ENCOUNTER — HOSPITAL ENCOUNTER (OUTPATIENT)
Dept: MAMMOGRAPHY | Age: 52
Discharge: HOME | End: 2022-09-26
Payer: COMMERCIAL

## 2022-09-23 DIAGNOSIS — Z12.31 SCREENING MAMMOGRAM, ENCOUNTER FOR: ICD-10-CM

## 2022-09-23 PROCEDURE — 77063 BREAST TOMOSYNTHESIS BI: CPT

## 2022-12-22 DIAGNOSIS — N32.81 OAB (OVERACTIVE BLADDER): ICD-10-CM

## 2022-12-22 RX ORDER — TROSPIUM CHLORIDE 20 MG/1
TABLET, FILM COATED ORAL
Qty: 180 TABLET | Refills: 3 | Status: SHIPPED | OUTPATIENT
Start: 2022-12-22

## 2023-01-18 DIAGNOSIS — E78.2 MIXED HYPERLIPIDEMIA: ICD-10-CM

## 2023-01-18 RX ORDER — ROSUVASTATIN CALCIUM 10 MG/1
TABLET, COATED ORAL
Qty: 30 TABLET | Refills: 3 | Status: SHIPPED | OUTPATIENT
Start: 2023-01-18

## 2023-07-27 ENCOUNTER — OFFICE VISIT (OUTPATIENT)
Dept: FAMILY MEDICINE CLINIC | Facility: CLINIC | Age: 53
End: 2023-07-27
Payer: COMMERCIAL

## 2023-07-27 VITALS
BODY MASS INDEX: 25.78 KG/M2 | SYSTOLIC BLOOD PRESSURE: 104 MMHG | WEIGHT: 151 LBS | OXYGEN SATURATION: 99 % | DIASTOLIC BLOOD PRESSURE: 66 MMHG | TEMPERATURE: 97.7 F | HEART RATE: 83 BPM | HEIGHT: 64 IN

## 2023-07-27 DIAGNOSIS — H65.92 LEFT SEROUS OTITIS MEDIA, UNSPECIFIED CHRONICITY: ICD-10-CM

## 2023-07-27 DIAGNOSIS — Z12.11 COLON CANCER SCREENING: Primary | ICD-10-CM

## 2023-07-27 PROCEDURE — 99213 OFFICE O/P EST LOW 20 MIN: CPT | Performed by: FAMILY MEDICINE

## 2023-07-27 RX ORDER — PREDNISONE 10 MG/1
10 TABLET ORAL 2 TIMES DAILY
Qty: 10 TABLET | Refills: 0 | Status: SHIPPED | OUTPATIENT
Start: 2023-07-27 | End: 2023-08-01

## 2023-07-27 SDOH — ECONOMIC STABILITY: HOUSING INSECURITY
IN THE LAST 12 MONTHS, WAS THERE A TIME WHEN YOU DID NOT HAVE A STEADY PLACE TO SLEEP OR SLEPT IN A SHELTER (INCLUDING NOW)?: NO

## 2023-07-27 SDOH — ECONOMIC STABILITY: FOOD INSECURITY: WITHIN THE PAST 12 MONTHS, THE FOOD YOU BOUGHT JUST DIDN'T LAST AND YOU DIDN'T HAVE MONEY TO GET MORE.: NEVER TRUE

## 2023-07-27 SDOH — ECONOMIC STABILITY: FOOD INSECURITY: WITHIN THE PAST 12 MONTHS, YOU WORRIED THAT YOUR FOOD WOULD RUN OUT BEFORE YOU GOT MONEY TO BUY MORE.: NEVER TRUE

## 2023-07-27 SDOH — ECONOMIC STABILITY: INCOME INSECURITY: HOW HARD IS IT FOR YOU TO PAY FOR THE VERY BASICS LIKE FOOD, HOUSING, MEDICAL CARE, AND HEATING?: NOT HARD AT ALL

## 2023-07-27 ASSESSMENT — ENCOUNTER SYMPTOMS
SINUS PAIN: 0
SHORTNESS OF BREATH: 0
EYE DISCHARGE: 0
COUGH: 0
CONSTIPATION: 0
DIARRHEA: 0
ABDOMINAL PAIN: 0
CHEST TIGHTNESS: 0

## 2023-07-27 ASSESSMENT — PATIENT HEALTH QUESTIONNAIRE - PHQ9
SUM OF ALL RESPONSES TO PHQ9 QUESTIONS 1 & 2: 0
2. FEELING DOWN, DEPRESSED OR HOPELESS: 0
SUM OF ALL RESPONSES TO PHQ QUESTIONS 1-9: 0
SUM OF ALL RESPONSES TO PHQ QUESTIONS 1-9: 0
1. LITTLE INTEREST OR PLEASURE IN DOING THINGS: 0
SUM OF ALL RESPONSES TO PHQ QUESTIONS 1-9: 0
SUM OF ALL RESPONSES TO PHQ QUESTIONS 1-9: 0

## 2023-07-27 NOTE — PROGRESS NOTES
Ade Palomo is a 47 y.o. female who presents with   Chief Complaint   Patient presents with    Ear Fullness     Bilat, L > R, will be flying soon. Long hx of ear issues       History of Present Illness  L ear muffled. No pain. Has chronic Tinnitus. Started a couple months ago after sinus infection. No dizziness. Review of Systems  Review of Systems   Constitutional:  Negative for appetite change, fatigue and fever. HENT:  Negative for congestion, ear pain and sinus pain. Eyes:  Negative for discharge. Respiratory:  Negative for cough, chest tightness and shortness of breath. Cardiovascular:  Negative for chest pain, palpitations and leg swelling. Gastrointestinal:  Negative for abdominal pain, constipation and diarrhea. Genitourinary:  Negative for dysuria. Musculoskeletal:  Negative for joint swelling. Skin:  Negative for rash. Neurological:  Negative for headaches. Hematological:  Negative for adenopathy. Psychiatric/Behavioral:  Negative for dysphoric mood. The patient is not nervous/anxious. Medications  Current Outpatient Medications   Medication Sig Dispense Refill    predniSONE (DELTASONE) 10 MG tablet Take 1 tablet by mouth 2 times daily for 5 days 10 tablet 0    rosuvastatin (CRESTOR) 10 MG tablet TAKE 1 TABLET BY MOUTH EVERY NIGHT 30 tablet 3    trospium (SANCTURA) 20 MG tablet TAKE 1 TABLET BY MOUTH TWICE DAILY 180 tablet 3    loratadine (CLARITIN) 10 MG tablet Take 1 tablet by mouth      SUMAtriptan (IMITREX) 100 MG tablet Take 1 tablet by mouth once as needed for Migraine Take one tab po for migraine. May repeat x1 if ineffective. Max 24-hr dosage 200mg. 9 tablet 5    clotrimazole-betamethasone (LOTRISONE) 1-0.05 % cream Apply topically 2 times daily (Patient not taking: Reported on 9/6/2022)       No current facility-administered medications for this visit.         Past Medical History  Past Medical History:   Diagnosis Date    Constipation

## 2023-08-03 DIAGNOSIS — G43.009 MIGRAINE WITHOUT AURA AND WITHOUT STATUS MIGRAINOSUS, NOT INTRACTABLE: ICD-10-CM

## 2023-08-04 RX ORDER — SUMATRIPTAN 100 MG/1
TABLET, FILM COATED ORAL
Qty: 9 TABLET | Refills: 5 | Status: SHIPPED | OUTPATIENT
Start: 2023-08-04

## 2023-08-07 ENCOUNTER — CLINICAL DOCUMENTATION (OUTPATIENT)
Dept: SURGERY | Age: 53
End: 2023-08-07

## 2023-08-07 NOTE — PROGRESS NOTES
Referral received for routine colonoscopy- screening or surveillance only. Chart reviewed by me on August 7, 2023. Did not schedule w/referral in 21 or to GI in 22.   Hx indicates prior exam 2012    Pt found to be acceptable candidate for direct scheduling if they are interested with the following special instructions (if any):

## 2023-08-30 DIAGNOSIS — E78.2 MIXED HYPERLIPIDEMIA: ICD-10-CM

## 2023-08-30 DIAGNOSIS — Z00.00 ENCOUNTER FOR GENERAL ADULT MEDICAL EXAMINATION WITHOUT ABNORMAL FINDINGS: Primary | ICD-10-CM

## 2023-08-31 ENCOUNTER — NURSE ONLY (OUTPATIENT)
Dept: FAMILY MEDICINE CLINIC | Facility: CLINIC | Age: 53
End: 2023-08-31

## 2023-08-31 DIAGNOSIS — E78.2 MIXED HYPERLIPIDEMIA: ICD-10-CM

## 2023-08-31 DIAGNOSIS — Z00.00 ENCOUNTER FOR GENERAL ADULT MEDICAL EXAMINATION WITHOUT ABNORMAL FINDINGS: ICD-10-CM

## 2023-08-31 LAB
ALBUMIN SERPL-MCNC: 3.9 G/DL (ref 3.5–5)
ALBUMIN/GLOB SERPL: 1.4 (ref 0.4–1.6)
ALP SERPL-CCNC: 73 U/L (ref 50–136)
ALT SERPL-CCNC: 23 U/L (ref 12–65)
ANION GAP SERPL CALC-SCNC: 7 MMOL/L (ref 2–11)
APPEARANCE UR: CLEAR
AST SERPL-CCNC: 15 U/L (ref 15–37)
BACTERIA URNS QL MICRO: NEGATIVE /HPF
BASOPHILS # BLD: 0.1 K/UL (ref 0–0.2)
BASOPHILS NFR BLD: 1 % (ref 0–2)
BILIRUB SERPL-MCNC: 0.8 MG/DL (ref 0.2–1.1)
BILIRUB UR QL: NEGATIVE
BUN SERPL-MCNC: 18 MG/DL (ref 6–23)
CALCIUM SERPL-MCNC: 9.5 MG/DL (ref 8.3–10.4)
CASTS URNS QL MICRO: ABNORMAL /LPF (ref 0–2)
CHLORIDE SERPL-SCNC: 110 MMOL/L (ref 101–110)
CHOLEST SERPL-MCNC: 154 MG/DL
CO2 SERPL-SCNC: 27 MMOL/L (ref 21–32)
COLOR UR: ABNORMAL
CREAT SERPL-MCNC: 0.7 MG/DL (ref 0.6–1)
DIFFERENTIAL METHOD BLD: NORMAL
EOSINOPHIL # BLD: 0.1 K/UL (ref 0–0.8)
EOSINOPHIL NFR BLD: 2 % (ref 0.5–7.8)
EPI CELLS #/AREA URNS HPF: ABNORMAL /HPF (ref 0–5)
ERYTHROCYTE [DISTWIDTH] IN BLOOD BY AUTOMATED COUNT: 12.9 % (ref 11.9–14.6)
GLOBULIN SER CALC-MCNC: 2.8 G/DL (ref 2.8–4.5)
GLUCOSE SERPL-MCNC: 94 MG/DL (ref 65–100)
GLUCOSE UR STRIP.AUTO-MCNC: NEGATIVE MG/DL
HCT VFR BLD AUTO: 41.6 % (ref 35.8–46.3)
HDLC SERPL-MCNC: 60 MG/DL (ref 40–60)
HDLC SERPL: 2.6
HGB BLD-MCNC: 13.8 G/DL (ref 11.7–15.4)
HGB UR QL STRIP: NEGATIVE
IMM GRANULOCYTES # BLD AUTO: 0 K/UL (ref 0–0.5)
IMM GRANULOCYTES NFR BLD AUTO: 0 % (ref 0–5)
KETONES UR QL STRIP.AUTO: NEGATIVE MG/DL
LDLC SERPL CALC-MCNC: 80.6 MG/DL
LEUKOCYTE ESTERASE UR QL STRIP.AUTO: ABNORMAL
LYMPHOCYTES # BLD: 1.9 K/UL (ref 0.5–4.6)
LYMPHOCYTES NFR BLD: 38 % (ref 13–44)
MCH RBC QN AUTO: 30.1 PG (ref 26.1–32.9)
MCHC RBC AUTO-ENTMCNC: 33.2 G/DL (ref 31.4–35)
MCV RBC AUTO: 90.6 FL (ref 82–102)
MONOCYTES # BLD: 0.5 K/UL (ref 0.1–1.3)
MONOCYTES NFR BLD: 9 % (ref 4–12)
MUCOUS THREADS URNS QL MICRO: 0 /LPF
NEUTS SEG # BLD: 2.5 K/UL (ref 1.7–8.2)
NEUTS SEG NFR BLD: 50 % (ref 43–78)
NITRITE UR QL STRIP.AUTO: NEGATIVE
NRBC # BLD: 0 K/UL (ref 0–0.2)
PH UR STRIP: 6.5 (ref 5–9)
PLATELET # BLD AUTO: 255 K/UL (ref 150–450)
PMV BLD AUTO: 12.3 FL (ref 9.4–12.3)
POTASSIUM SERPL-SCNC: 4.2 MMOL/L (ref 3.5–5.1)
PROT SERPL-MCNC: 6.7 G/DL (ref 6.3–8.2)
PROT UR STRIP-MCNC: NEGATIVE MG/DL
RBC # BLD AUTO: 4.59 M/UL (ref 4.05–5.2)
RBC #/AREA URNS HPF: ABNORMAL /HPF (ref 0–5)
SODIUM SERPL-SCNC: 144 MMOL/L (ref 133–143)
SP GR UR REFRACTOMETRY: 1.02 (ref 1–1.02)
TRIGL SERPL-MCNC: 67 MG/DL (ref 35–150)
TSH W FREE THYROID IF ABNORMAL: 2.32 UIU/ML (ref 0.36–3.74)
URINE CULTURE IF INDICATED: ABNORMAL
UROBILINOGEN UR QL STRIP.AUTO: 0.2 EU/DL (ref 0.2–1)
VLDLC SERPL CALC-MCNC: 13.4 MG/DL (ref 6–23)
WBC # BLD AUTO: 4.9 K/UL (ref 4.3–11.1)
WBC URNS QL MICRO: ABNORMAL /HPF (ref 0–4)

## 2023-09-06 ASSESSMENT — PATIENT HEALTH QUESTIONNAIRE - PHQ9
1. LITTLE INTEREST OR PLEASURE IN DOING THINGS: NOT AT ALL
SUM OF ALL RESPONSES TO PHQ QUESTIONS 1-9: 0
SUM OF ALL RESPONSES TO PHQ9 QUESTIONS 1 & 2: 0
2. FEELING DOWN, DEPRESSED OR HOPELESS: 0
2. FEELING DOWN, DEPRESSED OR HOPELESS: NOT AT ALL
SUM OF ALL RESPONSES TO PHQ9 QUESTIONS 1 & 2: 0
SUM OF ALL RESPONSES TO PHQ QUESTIONS 1-9: 0
1. LITTLE INTEREST OR PLEASURE IN DOING THINGS: 0

## 2023-09-07 ENCOUNTER — OFFICE VISIT (OUTPATIENT)
Dept: FAMILY MEDICINE CLINIC | Facility: CLINIC | Age: 53
End: 2023-09-07
Payer: COMMERCIAL

## 2023-09-07 ENCOUNTER — NURSE ONLY (OUTPATIENT)
Dept: FAMILY MEDICINE CLINIC | Facility: CLINIC | Age: 53
End: 2023-09-07

## 2023-09-07 ENCOUNTER — PREP FOR PROCEDURE (OUTPATIENT)
Dept: SURGERY | Age: 53
End: 2023-09-07

## 2023-09-07 ENCOUNTER — HOSPITAL ENCOUNTER (OUTPATIENT)
Dept: GENERAL RADIOLOGY | Age: 53
Discharge: HOME | End: 2023-09-07
Payer: COMMERCIAL

## 2023-09-07 VITALS
HEART RATE: 83 BPM | DIASTOLIC BLOOD PRESSURE: 68 MMHG | HEIGHT: 64 IN | TEMPERATURE: 98 F | OXYGEN SATURATION: 97 % | WEIGHT: 152 LBS | SYSTOLIC BLOOD PRESSURE: 96 MMHG | BODY MASS INDEX: 25.95 KG/M2

## 2023-09-07 DIAGNOSIS — Z12.4 SCREENING FOR MALIGNANT NEOPLASM OF CERVIX: Primary | ICD-10-CM

## 2023-09-07 DIAGNOSIS — M25.551 RIGHT HIP PAIN: ICD-10-CM

## 2023-09-07 DIAGNOSIS — E78.2 MIXED HYPERLIPIDEMIA: ICD-10-CM

## 2023-09-07 DIAGNOSIS — H65.22 LEFT CHRONIC SEROUS OTITIS MEDIA: ICD-10-CM

## 2023-09-07 DIAGNOSIS — H91.92 HEARING LOSS OF LEFT EAR, UNSPECIFIED HEARING LOSS TYPE: ICD-10-CM

## 2023-09-07 DIAGNOSIS — N32.81 OAB (OVERACTIVE BLADDER): ICD-10-CM

## 2023-09-07 DIAGNOSIS — Z00.00 PREVENTATIVE HEALTH CARE: Primary | ICD-10-CM

## 2023-09-07 PROBLEM — Z12.11 SPECIAL SCREENING FOR MALIGNANT NEOPLASMS, COLON: Status: ACTIVE | Noted: 2023-09-07

## 2023-09-07 PROCEDURE — 99396 PREV VISIT EST AGE 40-64: CPT | Performed by: FAMILY MEDICINE

## 2023-09-07 PROCEDURE — 73502 X-RAY EXAM HIP UNI 2-3 VIEWS: CPT

## 2023-09-07 RX ORDER — TROSPIUM CHLORIDE 20 MG/1
20 TABLET, FILM COATED ORAL 2 TIMES DAILY
Qty: 180 TABLET | Refills: 3 | Status: SHIPPED | OUTPATIENT
Start: 2023-09-07

## 2023-09-07 RX ORDER — ROSUVASTATIN CALCIUM 10 MG/1
10 TABLET, COATED ORAL NIGHTLY
Qty: 90 TABLET | Refills: 3 | Status: SHIPPED | OUTPATIENT
Start: 2023-09-07

## 2023-09-07 ASSESSMENT — ENCOUNTER SYMPTOMS
SHORTNESS OF BREATH: 0
ABDOMINAL PAIN: 0
CONSTIPATION: 0
COUGH: 0
EYE DISCHARGE: 0
SINUS PAIN: 0
DIARRHEA: 0
CHEST TIGHTNESS: 0

## 2023-09-07 NOTE — PROGRESS NOTES
Beatriz Rodriguez is a 47 y.o. female who presents with   Chief Complaint   Patient presents with    Annual Exam    Hip Pain     Right, stiffness. Started collagen peptides which seems to have helped sxs. History of Present Illness  Here for cpx. Labs look good. R hip and groin pain x 1 year. Feels stiff. No injury. FH mother with bilat THR. Migraines controlled with prn Imitrex. Less frequent now. Minimal hot flashes. Mood good. Sleeping well. Review of Systems  Review of Systems   Constitutional:  Negative for appetite change, fatigue and fever. HENT:  Negative for congestion, ear pain and sinus pain. Eyes:  Negative for discharge. Respiratory:  Negative for cough, chest tightness and shortness of breath. Cardiovascular:  Negative for chest pain, palpitations and leg swelling. Gastrointestinal:  Negative for abdominal pain, constipation and diarrhea. Genitourinary:  Negative for dysuria. Musculoskeletal:  Negative for joint swelling. Skin:  Negative for rash. Neurological:  Negative for headaches. Hematological:  Negative for adenopathy. Psychiatric/Behavioral:  Negative for dysphoric mood. The patient is not nervous/anxious. Medications  Current Outpatient Medications   Medication Sig Dispense Refill    trospium (SANCTURA) 20 MG tablet Take 1 tablet by mouth 2 times daily 180 tablet 3    rosuvastatin (CRESTOR) 10 MG tablet Take 1 tablet by mouth nightly 90 tablet 3    Nutritional Supplements (ESTROVEN PO) Take by mouth      SUMAtriptan (IMITREX) 100 MG tablet TAKE 1 TABLET BY MOUTH 1 TIME AS NEEDED FOR MIGRAINE. MAY REPEAT 1 TIME IF INEFFECTIVE. MAX 24-HOUR DOSAGE 200 MG 9 tablet 5    loratadine (CLARITIN) 10 MG tablet Take 1 tablet by mouth       No current facility-administered medications for this visit.         Past Medical History  Past Medical History:   Diagnosis Date    Constipation     Hypercholesterolemia     Migraine        Surgical History  Past Surgical

## 2023-09-11 RX ORDER — SOD SULF/POT CHLORIDE/MAG SULF 1.479 G
TABLET ORAL
Qty: 24 TABLET | Refills: 0 | Status: SHIPPED | OUTPATIENT
Start: 2023-09-11

## 2023-09-13 LAB
CYTOLOGIST CVX/VAG CYTO: NORMAL
CYTOLOGY CVX/VAG DOC THIN PREP: NORMAL
HPV APTIMA: NEGATIVE
Lab: NORMAL
PATH REPORT.FINAL DX SPEC: NORMAL
STAT OF ADQ CVX/VAG CYTO-IMP: NORMAL

## 2023-10-02 ENCOUNTER — TELEPHONE (OUTPATIENT)
Dept: SURGERY | Age: 53
End: 2023-10-02

## 2023-10-02 NOTE — TELEPHONE ENCOUNTER
[id number] [facility] scheduled for: 10/6/23      *Has patient picked up Sutab prescription? Yes       *Discussed instructions for how to take these and instructions for the week prior to your procedure? Yes       *Stopped taking blood thinners (if on them) at appropriate time? Yes       *Discussed instructions for the next couple of days? Yes       *Patient reminded of location of procedure and time of arrival?     Yes       *Who will be bringing patient and staying at the hospital with them during your procedure? yes      *Informed patient that they should receive a call from the hospital as well to pre-register them for this procedure? Yes       *Informed them of contact info if needed to cancel or reschedule this procedure?      Yes

## 2023-10-05 ENCOUNTER — ANESTHESIA EVENT (OUTPATIENT)
Dept: ENDOSCOPY | Age: 53
End: 2023-10-05
Payer: COMMERCIAL

## 2023-10-05 RX ORDER — ONDANSETRON 2 MG/ML
4 INJECTION INTRAMUSCULAR; INTRAVENOUS
Status: CANCELLED | OUTPATIENT
Start: 2023-10-05 | End: 2023-10-06

## 2023-10-05 RX ORDER — SODIUM CHLORIDE 0.9 % (FLUSH) 0.9 %
5-40 SYRINGE (ML) INJECTION PRN
Status: CANCELLED | OUTPATIENT
Start: 2023-10-05

## 2023-10-05 RX ORDER — SODIUM CHLORIDE 0.9 % (FLUSH) 0.9 %
5-40 SYRINGE (ML) INJECTION EVERY 12 HOURS SCHEDULED
Status: CANCELLED | OUTPATIENT
Start: 2023-10-05

## 2023-10-05 RX ORDER — SODIUM CHLORIDE 9 MG/ML
INJECTION, SOLUTION INTRAVENOUS PRN
Status: CANCELLED | OUTPATIENT
Start: 2023-10-05

## 2023-10-05 NOTE — PROGRESS NOTES
RN called Pt to confirm appointment time of [phone], arrival time 0720, location,  requirement, and instructions for registration at the hospital.  Pt verbalized understanding.

## 2023-10-05 NOTE — PERIOP NOTE
Patient name, , and procedure verified. Type: 1a; abbreviated assessment per anesthesia guidelines    Labs per anesthesia: none    Instructed will receive notificattion the day before procedure by the GI Lab of time of arrival for [address] [place] [address] cases, and by Pre-op Department for [place] [address] OF [facility] cases. Arrival times should be called by 5 pm. If no phone is received the patient should contact their respective hospital. The GI lab telephone number is [phone] and ES Pre-op is [phone]. Follow diet and prep instructions per office including NPO status. If patient has NOT received instructions from office patient is advised to call surgeon office, verbalizes understanding. Bath or shower the night before and the am of surgery with non-mositurizing soap. No lotions, oils, powders, cologne on skin. No make up, eye make up or jewelry. Wear loose fitting comfortable, clean clothing. Must have adult present in building the entire time . TAKE ONLY THE FOLLOWING MEDICATION ON THE DAY OF THE PROCEDURE : none      The following discharge instructions reviewed : medication given during procedure may cause drowsiness for several hours, therefore, patient must not drive or operate machinery for remainder of the day. Patient may not drink alcohol on the day of your procedure, and should resume regular diet and activity unless otherwise directed. You may experience abdominal distention for several hours that is relieved by the passage of gas. Contact your physician if you have any of the following: fever or chills, severe abdominal pain or excessive amount of bleeding or a large amount when having a bowel movement. Occasional specks of blood with bowel movement would not be unusual.      You may be required to pay a deductible or co-pay on the day of your procedure. You can pre-pay by calling [phone] if your surgery is at the [facility] or [phone] if your surgery is at the [facility].

## 2023-10-06 ENCOUNTER — HOSPITAL ENCOUNTER (OUTPATIENT)
Age: 53
Setting detail: OUTPATIENT SURGERY
Discharge: HOME | End: 2023-10-06
Attending: SURGERY | Admitting: SURGERY
Payer: COMMERCIAL

## 2023-10-06 ENCOUNTER — ANESTHESIA (OUTPATIENT)
Dept: ENDOSCOPY | Age: 53
End: 2023-10-06
Payer: COMMERCIAL

## 2023-10-06 VITALS
HEART RATE: 91 BPM | SYSTOLIC BLOOD PRESSURE: 116 MMHG | TEMPERATURE: 98.6 F | RESPIRATION RATE: 18 BRPM | WEIGHT: 152 LBS | HEIGHT: 64 IN | BODY MASS INDEX: 25.95 KG/M2 | OXYGEN SATURATION: 97 % | DIASTOLIC BLOOD PRESSURE: 68 MMHG

## 2023-10-06 DIAGNOSIS — Z12.11 SPECIAL SCREENING FOR MALIGNANT NEOPLASMS, COLON: ICD-10-CM

## 2023-10-06 PROBLEM — D12.2 BENIGN NEOPLASM OF ASCENDING COLON: Status: ACTIVE | Noted: 2023-10-06

## 2023-10-06 PROBLEM — D12.3 BENIGN NEOPLASM OF TRANSVERSE COLON: Status: ACTIVE | Noted: 2023-10-06

## 2023-10-06 PROCEDURE — 6360000002 HC RX W HCPCS: Performed by: NURSE ANESTHETIST, CERTIFIED REGISTERED

## 2023-10-06 PROCEDURE — 45384 COLONOSCOPY W/LESION REMOVAL: CPT | Performed by: SURGERY

## 2023-10-06 PROCEDURE — 88305 TISSUE EXAM BY PATHOLOGIST: CPT

## 2023-10-06 PROCEDURE — 2709999900 HC NON-CHARGEABLE SUPPLY: Performed by: SURGERY

## 2023-10-06 PROCEDURE — 3700000001 HC ADD 15 MINUTES (ANESTHESIA): Performed by: SURGERY

## 2023-10-06 PROCEDURE — 2500000003 HC RX 250 WO HCPCS: Performed by: NURSE ANESTHETIST, CERTIFIED REGISTERED

## 2023-10-06 PROCEDURE — 3700000000 HC ANESTHESIA ATTENDED CARE: Performed by: SURGERY

## 2023-10-06 PROCEDURE — 45385 COLONOSCOPY W/LESION REMOVAL: CPT | Performed by: SURGERY

## 2023-10-06 PROCEDURE — 7100000010 HC PHASE II RECOVERY - FIRST 15 MIN: Performed by: SURGERY

## 2023-10-06 PROCEDURE — 3609010600 HC COLONOSCOPY POLYPECTOMY SNARE/COLD BIOPSY: Performed by: SURGERY

## 2023-10-06 PROCEDURE — 2580000003 HC RX 258: Performed by: ANESTHESIOLOGY

## 2023-10-06 PROCEDURE — 2580000003 HC RX 258: Performed by: NURSE ANESTHETIST, CERTIFIED REGISTERED

## 2023-10-06 PROCEDURE — 7100000011 HC PHASE II RECOVERY - ADDTL 15 MIN: Performed by: SURGERY

## 2023-10-06 RX ORDER — SODIUM CHLORIDE 0.9 % (FLUSH) 0.9 %
5-40 SYRINGE (ML) INJECTION PRN
Status: DISCONTINUED | OUTPATIENT
Start: 2023-10-06 | End: 2023-10-06 | Stop reason: HOSPADM

## 2023-10-06 RX ORDER — SODIUM CHLORIDE 0.9 % (FLUSH) 0.9 %
5-40 SYRINGE (ML) INJECTION EVERY 12 HOURS SCHEDULED
Status: DISCONTINUED | OUTPATIENT
Start: 2023-10-06 | End: 2023-10-06 | Stop reason: HOSPADM

## 2023-10-06 RX ORDER — SODIUM CHLORIDE 9 MG/ML
INJECTION, SOLUTION INTRAVENOUS PRN
Status: DISCONTINUED | OUTPATIENT
Start: 2023-10-06 | End: 2023-10-06 | Stop reason: HOSPADM

## 2023-10-06 RX ORDER — PROPOFOL 10 MG/ML
INJECTION, EMULSION INTRAVENOUS PRN
Status: DISCONTINUED | OUTPATIENT
Start: 2023-10-06 | End: 2023-10-06 | Stop reason: SDUPTHER

## 2023-10-06 RX ORDER — LIDOCAINE HYDROCHLORIDE 20 MG/ML
INJECTION, SOLUTION EPIDURAL; INFILTRATION; INTRACAUDAL; PERINEURAL PRN
Status: DISCONTINUED | OUTPATIENT
Start: 2023-10-06 | End: 2023-10-06 | Stop reason: SDUPTHER

## 2023-10-06 RX ORDER — LIDOCAINE HYDROCHLORIDE 10 MG/ML
1 INJECTION, SOLUTION INFILTRATION; PERINEURAL
Status: DISCONTINUED | OUTPATIENT
Start: 2023-10-06 | End: 2023-10-06 | Stop reason: HOSPADM

## 2023-10-06 RX ORDER — SODIUM CHLORIDE, SODIUM LACTATE, POTASSIUM CHLORIDE, CALCIUM CHLORIDE 600; 310; 30; 20 MG/100ML; MG/100ML; MG/100ML; MG/100ML
INJECTION, SOLUTION INTRAVENOUS CONTINUOUS
Status: DISCONTINUED | OUTPATIENT
Start: 2023-10-06 | End: 2023-10-06 | Stop reason: HOSPADM

## 2023-10-06 RX ORDER — SODIUM CHLORIDE, SODIUM LACTATE, POTASSIUM CHLORIDE, CALCIUM CHLORIDE 600; 310; 30; 20 MG/100ML; MG/100ML; MG/100ML; MG/100ML
INJECTION, SOLUTION INTRAVENOUS CONTINUOUS PRN
Status: DISCONTINUED | OUTPATIENT
Start: 2023-10-06 | End: 2023-10-06 | Stop reason: SDUPTHER

## 2023-10-06 RX ADMIN — PROPOFOL 70 MG: 10 INJECTION, EMULSION INTRAVENOUS at 09:16

## 2023-10-06 RX ADMIN — PROPOFOL 120 MCG/KG/MIN: 10 INJECTION, EMULSION INTRAVENOUS at 09:17

## 2023-10-06 RX ADMIN — SODIUM CHLORIDE, POTASSIUM CHLORIDE, SODIUM LACTATE AND CALCIUM CHLORIDE: 600; 310; 30; 20 INJECTION, SOLUTION INTRAVENOUS at 08:16

## 2023-10-06 RX ADMIN — SODIUM CHLORIDE, SODIUM LACTATE, POTASSIUM CHLORIDE, AND CALCIUM CHLORIDE: 600; 310; 30; 20 INJECTION, SOLUTION INTRAVENOUS at 09:12

## 2023-10-06 RX ADMIN — LIDOCAINE HYDROCHLORIDE 60 MG: 20 INJECTION, SOLUTION EPIDURAL; INFILTRATION; INTRACAUDAL; PERINEURAL at 09:16

## 2023-10-06 ASSESSMENT — PAIN - FUNCTIONAL ASSESSMENT: PAIN_FUNCTIONAL_ASSESSMENT: NONE - DENIES PAIN

## 2023-10-06 NOTE — ANESTHESIA PRE PROCEDURE
intravenous. Anesthetic plan and risks discussed with patient.                         Jovanny Villatoro MD   10/6/2023

## 2023-10-06 NOTE — PROGRESS NOTES
Discharge instructions were reviewed with patient and . An opportunity was given for questions. Patient and  verbalized understanding, and has no questions at this time.

## 2023-10-06 NOTE — H&P
History and Physical      Patient: Cameron Dimas    Physician: Cameron Claudio MD    Referring Physician: Thomas Reeves MD    Chief Complaint: For colonoscopy    History of Present Illness: Pt presents for colonoscopy for screening. History:  Past Medical History:   Diagnosis Date    Anesthesia     woke up during D&C, also experienced pain when awakened, was awake for rest of the procedure- no problems with colonoscopy    Constipation     Hypercholesterolemia     Migraine      Past Surgical History:   Procedure Laterality Date    COLONOSCOPY  2012    DILATION AND CURETTAGE OF UTERUS        Social History     Socioeconomic History    Marital status:      Spouse name: None    Number of children: None    Years of education: None    Highest education level: None   Tobacco Use    Smoking status: Former     Packs/day: 0.50     Years: 3.00     Additional pack years: 0.00     Total pack years: 1.50     Types: Cigarettes     Start date:      Quit date:      Years since quittin.    Smokeless tobacco: Never   Vaping Use    Vaping Use: Never used   Substance and Sexual Activity    Alcohol use:  Yes     Alcohol/week: 3.0 standard drinks of alcohol     Types: 1 Glasses of wine, 2 Cans of beer per week    Drug use: No    Sexual activity: Defer     Social Determinants of Health     Financial Resource Strain: Low Risk  (2023)    Overall Financial Resource Strain (CARDIA)     Difficulty of Paying Living Expenses: Not hard at all   Food Insecurity: No Food Insecurity (2023)    Hunger Vital Sign     Worried About Running Out of Food in the Last Year: Never true     Ran Out of Food in the Last Year: Never true   Transportation Needs: Unknown (2023)    PRAPARE - Transportation     Lack of Transportation (Non-Medical): No   Housing Stability: Unknown (2023)    Housing Stability Vital Sign     Unstable Housing in the Last Year: No      Family History   Problem Relation Age of Onset

## 2023-10-06 NOTE — OP NOTE
Operative Report    Patient: Beata Kim MRN: [id number]      YOB: 1970  Age: 47 y.o. Sex: female            Preoperative Diagnosis: screening for colon cancer    Postoperative Diagnosis: polyp x 2    Procedure:  71549-Xrgqigunmno with removal lesion, snare and 13811-Pryhmmwzqxa with removal lesion, hot forceps    Anesthesia: ANDREW-per anesthesia    Indications:  As outlined in the History and Physical.      Procedure in Detail:  Informed consent was obtained for the procedure. The patient was placed in the left lateral decubitus position and sedation was induced by anesthesia. The scope was inserted into the rectum and advanced under direct vision to the cecum, which was identified by the ileocecal valve and appendiceal orifice. The quality of the colonic preparation was excellent. A careful inspection was made as the colonoscope was withdrawn, including a retroflexed view of the rectum; findings and interventions are described below. Appropriate photodocumentation was obtained. Findings:     Anus:  Normal  Rectum:   Normal      -2 hypertrophic papillae  Sigmoid:       -Excavated lesions:     -rare diminutive diverticulum  Descending Colon:   Normal  Transverse Colon:       -Protruding lesions:     -Sessile Polyp(s) size 2 mm removed by polypectomy (hot biopsy)  Ascending Colon:       -Protruding lesions:     -Sessile Polyp(s) size 3 mm removed by polypectomy (snare cautery) and polypectomy (hot biopsy)  Cecum:   Normal  Terminal Ileum: Not entered    Specimens: Specimens were collected and sent to pathology. Complications: None; patient tolerated the procedure well. \    EBL -insignificant    Recommendations:   - Await pathology.       Signed By:  Janine Jackson MD     October 6, 2023

## 2023-10-07 PROBLEM — Z12.11 SPECIAL SCREENING FOR MALIGNANT NEOPLASMS, COLON: Status: RESOLVED | Noted: 2023-09-07 | Resolved: 2023-10-07

## 2023-10-09 ENCOUNTER — TELEPHONE (OUTPATIENT)
Dept: SURGERY | Age: 53
End: 2023-10-09

## 2023-10-09 NOTE — TELEPHONE ENCOUNTER
----- Message from Baltazar Dey MD sent at 10/9/2023 10:07 AM EDT -----  Recall in 7 years: 1 insignificant polyp, 1 low-risk adenoma

## 2024-01-23 ENCOUNTER — TELEPHONE (OUTPATIENT)
Dept: FAMILY MEDICINE CLINIC | Facility: CLINIC | Age: 54
End: 2024-01-23

## 2024-01-23 NOTE — TELEPHONE ENCOUNTER
LM for patient to call back to reschedule physical appointment due to doctor being out of the office. I told her we may be able to schedule with the nurse practitioner the same day.

## 2024-01-25 ENCOUNTER — OFFICE VISIT (OUTPATIENT)
Dept: ENT CLINIC | Age: 54
End: 2024-01-25
Payer: COMMERCIAL

## 2024-01-25 ENCOUNTER — OFFICE VISIT (OUTPATIENT)
Dept: AUDIOLOGY | Age: 54
End: 2024-01-25
Payer: COMMERCIAL

## 2024-01-25 VITALS
SYSTOLIC BLOOD PRESSURE: 116 MMHG | WEIGHT: 152 LBS | HEIGHT: 64 IN | DIASTOLIC BLOOD PRESSURE: 68 MMHG | BODY MASS INDEX: 25.95 KG/M2

## 2024-01-25 DIAGNOSIS — H69.93 DYSFUNCTION OF BOTH EUSTACHIAN TUBES: Primary | Chronic | ICD-10-CM

## 2024-01-25 DIAGNOSIS — H90.3 SENSORINEURAL HEARING LOSS, BILATERAL: Primary | ICD-10-CM

## 2024-01-25 DIAGNOSIS — J34.3 HYPERTROPHY OF INFERIOR NASAL TURBINATE: Chronic | ICD-10-CM

## 2024-01-25 PROCEDURE — 99203 OFFICE O/P NEW LOW 30 MIN: CPT | Performed by: PHYSICIAN ASSISTANT

## 2024-01-25 PROCEDURE — 92567 TYMPANOMETRY: CPT | Performed by: AUDIOLOGIST

## 2024-01-25 PROCEDURE — 92557 COMPREHENSIVE HEARING TEST: CPT | Performed by: AUDIOLOGIST

## 2024-01-25 ASSESSMENT — ENCOUNTER SYMPTOMS
RESPIRATORY NEGATIVE: 1
EYES NEGATIVE: 1
ALLERGIC/IMMUNOLOGIC NEGATIVE: 1
GASTROINTESTINAL NEGATIVE: 1

## 2024-01-25 NOTE — PROGRESS NOTES
AUDIOLOGY EVALUATION    Maribel Tejada had Tympanometry and Audiometry performed today.    The patient reports hearing loss in her left ear.     Results as follows:    Tympanometry    Type A -  on left  Type C -  on right    Audiometry    Test Performed - Comprehensive Audiogram    Type of Loss - Right Ear: abnormal hearing: degree of loss is normal to mild sensorineural hearing loss                           Left Ear: abnormal hearing: degree of loss is normal to mild sensorineural hearing loss     SRT   Measurement Right Ear Left Ear   Value 15 15   Unit dB dB     Discrimination  Measurement Right Ear Left Ear   Value 100% 100%   Unit dB dB     Recommend  Annual audios    Naya Worthington [facility]  Audiologist

## 2024-01-25 NOTE — PROGRESS NOTES
for ETD.    Patient agrees with this plan.        BEVERLY Valenzuela    This note was generated using voice recognition software, please excuse any typos.

## 2024-03-07 ENCOUNTER — OFFICE VISIT (OUTPATIENT)
Dept: ENT CLINIC | Age: 54
End: 2024-03-07

## 2024-03-07 VITALS
WEIGHT: 151 LBS | DIASTOLIC BLOOD PRESSURE: 68 MMHG | BODY MASS INDEX: 25.78 KG/M2 | HEIGHT: 64 IN | SYSTOLIC BLOOD PRESSURE: 116 MMHG

## 2024-03-07 DIAGNOSIS — J34.3 HYPERTROPHY OF INFERIOR NASAL TURBINATE: Chronic | ICD-10-CM

## 2024-03-07 DIAGNOSIS — H69.93 DYSFUNCTION OF BOTH EUSTACHIAN TUBES: Primary | Chronic | ICD-10-CM

## 2024-03-07 ASSESSMENT — ENCOUNTER SYMPTOMS
ALLERGIC/IMMUNOLOGIC NEGATIVE: 1
RESPIRATORY NEGATIVE: 1
GASTROINTESTINAL NEGATIVE: 1
EYES NEGATIVE: 1

## 2024-03-07 NOTE — PROGRESS NOTES
Maribel Tejada is a 53 y.o. female presents today for recheck of eustachian tube dysfunction she utilized on the vent has been consistent with the use of intranasal steroid.  She feels better with the decrease in the frequency of the episodes.    Chief Complaint   Patient presents with    Follow-up     6 week f/u for ETD.  Patient states that she has been using the otovent.  Patient states she has not noticed the muffled hearing as much but she also isn't sure if there has been anywhere to be able to check if its gotten better in large crowds.  States that the left ear takes a little more effort to get to pop vs the right ear.        Patient Active Problem List   Diagnosis    CHUN (stress urinary incontinence, female)    Migraine    OAB (overactive bladder)    Benign neoplasm of transverse colon    Benign neoplasm of ascending colon        Reviewed and updated this visit by provider:  Tobacco  Allergies  Meds  Problems  Med Hx  Surg Hx  Fam Hx         Review of Systems   Constitutional: Negative.    HENT: Negative.     Eyes: Negative.    Respiratory: Negative.     Cardiovascular: Negative.    Gastrointestinal: Negative.    Endocrine: Negative.    Genitourinary: Negative.    Musculoskeletal: Negative.    Skin: Negative.    Allergic/Immunologic: Negative.    Neurological: Negative.    Hematological: Negative.    Psychiatric/Behavioral: Negative.          /68 (Site: Right Upper Arm, Position: Sitting)   Ht 1.626 m (5' 4\")   Wt 68.5 kg (151 lb)   BMI 25.92 kg/m²     Physical Exam:    General: Well developed, well nourished, in no acute distress  Communication: The patient communicates appropriately for their age.  Voice: Normal.  Head, Face, and Salivary Glands: No head or facial abnormalities present, No masses or lesions present, Overall appearance is normal, No abnormality of parotid or submandibular glands present.    External Ears: appearance is normal with no scars, lesions or masses.   Right Ear:

## 2024-07-09 DIAGNOSIS — G43.009 MIGRAINE WITHOUT AURA AND WITHOUT STATUS MIGRAINOSUS, NOT INTRACTABLE: ICD-10-CM

## 2024-07-09 RX ORDER — SUMATRIPTAN 100 MG/1
TABLET, FILM COATED ORAL
Qty: 9 TABLET | Refills: 5 | Status: SHIPPED | OUTPATIENT
Start: 2024-07-09

## 2024-08-27 DIAGNOSIS — N32.81 OAB (OVERACTIVE BLADDER): ICD-10-CM

## 2024-08-27 RX ORDER — TROSPIUM CHLORIDE 20 MG/1
20 TABLET, FILM COATED ORAL 2 TIMES DAILY
Qty: 180 TABLET | Refills: 3 | Status: SHIPPED | OUTPATIENT
Start: 2024-08-27

## 2024-09-05 ENCOUNTER — LAB (OUTPATIENT)
Dept: FAMILY MEDICINE CLINIC | Facility: CLINIC | Age: 54
End: 2024-09-05

## 2024-09-05 DIAGNOSIS — E78.2 MIXED HYPERLIPIDEMIA: ICD-10-CM

## 2024-09-05 DIAGNOSIS — Z00.00 ROUTINE GENERAL MEDICAL EXAMINATION AT A HEALTH CARE FACILITY: Primary | ICD-10-CM

## 2024-09-05 DIAGNOSIS — Z00.00 ROUTINE GENERAL MEDICAL EXAMINATION AT A HEALTH CARE FACILITY: ICD-10-CM

## 2024-09-05 LAB
ALBUMIN SERPL-MCNC: 4.4 G/DL (ref 3.5–5)
ALBUMIN/GLOB SERPL: 1.7 (ref 1–1.9)
ALP SERPL-CCNC: 74 U/L (ref 35–104)
ALT SERPL-CCNC: 27 U/L (ref 12–65)
ANION GAP SERPL CALC-SCNC: 9 MMOL/L (ref 9–18)
APPEARANCE UR: CLEAR
AST SERPL-CCNC: 24 U/L (ref 15–37)
BACTERIA URNS QL MICRO: NEGATIVE /HPF
BASOPHILS # BLD: 0 K/UL (ref 0–0.2)
BASOPHILS NFR BLD: 1 % (ref 0–2)
BILIRUB SERPL-MCNC: 0.6 MG/DL (ref 0–1.2)
BILIRUB UR QL: NEGATIVE
BUN SERPL-MCNC: 14 MG/DL (ref 6–23)
CALCIUM SERPL-MCNC: 10 MG/DL (ref 8.8–10.2)
CASTS URNS QL MICRO: 0 /LPF
CHLORIDE SERPL-SCNC: 105 MMOL/L (ref 98–107)
CHOLEST SERPL-MCNC: 156 MG/DL (ref 0–200)
CO2 SERPL-SCNC: 28 MMOL/L (ref 20–28)
COLOR UR: ABNORMAL
CREAT SERPL-MCNC: 0.72 MG/DL (ref 0.6–1.1)
CRYSTALS URNS QL MICRO: 0 /LPF
DIFFERENTIAL METHOD BLD: ABNORMAL
EOSINOPHIL # BLD: 0.1 K/UL (ref 0–0.8)
EOSINOPHIL NFR BLD: 2 % (ref 0.5–7.8)
EPI CELLS #/AREA URNS HPF: ABNORMAL /HPF (ref 0–5)
ERYTHROCYTE [DISTWIDTH] IN BLOOD BY AUTOMATED COUNT: 12.8 % (ref 11.9–14.6)
GLOBULIN SER CALC-MCNC: 2.6 G/DL (ref 2.3–3.5)
GLUCOSE SERPL-MCNC: 90 MG/DL (ref 70–99)
GLUCOSE UR STRIP.AUTO-MCNC: NEGATIVE MG/DL
HCT VFR BLD AUTO: 41.7 % (ref 35.8–46.3)
HDLC SERPL-MCNC: 55 MG/DL (ref 40–60)
HDLC SERPL: 2.8 (ref 0–5)
HGB BLD-MCNC: 13.6 G/DL (ref 11.7–15.4)
HGB UR QL STRIP: ABNORMAL
HYALINE CASTS URNS QL MICRO: ABNORMAL /LPF
IMM GRANULOCYTES # BLD AUTO: 0 K/UL (ref 0–0.5)
IMM GRANULOCYTES NFR BLD AUTO: 0 % (ref 0–5)
KETONES UR QL STRIP.AUTO: NEGATIVE MG/DL
LDLC SERPL CALC-MCNC: 86 MG/DL (ref 0–100)
LEUKOCYTE ESTERASE UR QL STRIP.AUTO: NEGATIVE
LYMPHOCYTES # BLD: 1.9 K/UL (ref 0.5–4.6)
LYMPHOCYTES NFR BLD: 39 % (ref 13–44)
MCH RBC QN AUTO: 29.8 PG (ref 26.1–32.9)
MCHC RBC AUTO-ENTMCNC: 32.6 G/DL (ref 31.4–35)
MCV RBC AUTO: 91.2 FL (ref 82–102)
MONOCYTES # BLD: 0.4 K/UL (ref 0.1–1.3)
MONOCYTES NFR BLD: 7 % (ref 4–12)
MUCOUS THREADS URNS QL MICRO: 0 /LPF
NEUTS SEG # BLD: 2.4 K/UL (ref 1.7–8.2)
NEUTS SEG NFR BLD: 51 % (ref 43–78)
NITRITE UR QL STRIP.AUTO: NEGATIVE
NRBC # BLD: 0 K/UL (ref 0–0.2)
PH UR STRIP: 5.5 (ref 5–9)
PLATELET # BLD AUTO: 208 K/UL (ref 150–450)
PMV BLD AUTO: 12.9 FL (ref 9.4–12.3)
POTASSIUM SERPL-SCNC: 4.6 MMOL/L (ref 3.5–5.1)
PROT SERPL-MCNC: 7 G/DL (ref 6.3–8.2)
PROT UR STRIP-MCNC: NEGATIVE MG/DL
RBC # BLD AUTO: 4.57 M/UL (ref 4.05–5.2)
RBC #/AREA URNS HPF: ABNORMAL /HPF (ref 0–5)
SODIUM SERPL-SCNC: 142 MMOL/L (ref 136–145)
SP GR UR REFRACTOMETRY: 1.01 (ref 1–1.02)
TRIGL SERPL-MCNC: 74 MG/DL (ref 0–150)
TSH W FREE THYROID IF ABNORMAL: 2.39 UIU/ML (ref 0.27–4.2)
URINE CULTURE IF INDICATED: ABNORMAL
UROBILINOGEN UR QL STRIP.AUTO: 0.2 EU/DL (ref 0.2–1)
VLDLC SERPL CALC-MCNC: 15 MG/DL (ref 6–23)
WBC # BLD AUTO: 4.8 K/UL (ref 4.3–11.1)
WBC URNS QL MICRO: ABNORMAL /HPF (ref 0–4)

## 2024-10-08 ASSESSMENT — PATIENT HEALTH QUESTIONNAIRE - PHQ9
2. FEELING DOWN, DEPRESSED OR HOPELESS: NOT AT ALL
2. FEELING DOWN, DEPRESSED OR HOPELESS: NOT AT ALL
SUM OF ALL RESPONSES TO PHQ9 QUESTIONS 1 & 2: 0
SUM OF ALL RESPONSES TO PHQ9 QUESTIONS 1 & 2: 0
SUM OF ALL RESPONSES TO PHQ QUESTIONS 1-9: 0
1. LITTLE INTEREST OR PLEASURE IN DOING THINGS: NOT AT ALL
1. LITTLE INTEREST OR PLEASURE IN DOING THINGS: NOT AT ALL
SUM OF ALL RESPONSES TO PHQ QUESTIONS 1-9: 0

## 2024-10-09 ENCOUNTER — OFFICE VISIT (OUTPATIENT)
Dept: FAMILY MEDICINE CLINIC | Facility: CLINIC | Age: 54
End: 2024-10-09
Payer: COMMERCIAL

## 2024-10-09 VITALS
DIASTOLIC BLOOD PRESSURE: 60 MMHG | HEART RATE: 84 BPM | OXYGEN SATURATION: 98 % | WEIGHT: 154 LBS | BODY MASS INDEX: 26.29 KG/M2 | HEIGHT: 64 IN | SYSTOLIC BLOOD PRESSURE: 112 MMHG | TEMPERATURE: 97.2 F

## 2024-10-09 DIAGNOSIS — Z12.31 SCREENING MAMMOGRAM, ENCOUNTER FOR: ICD-10-CM

## 2024-10-09 DIAGNOSIS — Z00.00 PREVENTATIVE HEALTH CARE: Primary | ICD-10-CM

## 2024-10-09 DIAGNOSIS — E78.2 MIXED HYPERLIPIDEMIA: ICD-10-CM

## 2024-10-09 DIAGNOSIS — G43.009 MIGRAINE WITHOUT AURA AND WITHOUT STATUS MIGRAINOSUS, NOT INTRACTABLE: ICD-10-CM

## 2024-10-09 PROCEDURE — 99396 PREV VISIT EST AGE 40-64: CPT | Performed by: FAMILY MEDICINE

## 2024-10-09 RX ORDER — SUMATRIPTAN 100 MG/1
100 TABLET, FILM COATED ORAL DAILY PRN
Qty: 9 TABLET | Refills: 5 | Status: SHIPPED | OUTPATIENT
Start: 2024-10-09

## 2024-10-09 RX ORDER — ROSUVASTATIN CALCIUM 10 MG/1
10 TABLET, COATED ORAL NIGHTLY
Qty: 90 TABLET | Refills: 3 | Status: SHIPPED | OUTPATIENT
Start: 2024-10-09 | End: 2024-10-09 | Stop reason: SDUPTHER

## 2024-10-09 RX ORDER — ROSUVASTATIN CALCIUM 10 MG/1
10 TABLET, COATED ORAL NIGHTLY
Qty: 90 TABLET | Refills: 3 | Status: SHIPPED | OUTPATIENT
Start: 2024-10-09

## 2024-10-09 ASSESSMENT — ENCOUNTER SYMPTOMS
ABDOMINAL PAIN: 0
COUGH: 0
CHEST TIGHTNESS: 0
SINUS PAIN: 0
DIARRHEA: 0
EYE DISCHARGE: 0
SHORTNESS OF BREATH: 0
CONSTIPATION: 0
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
## 2025-01-14 ENCOUNTER — HOSPITAL ENCOUNTER (OUTPATIENT)
Dept: MAMMOGRAPHY | Age: 55
Discharge: HOME | End: 2025-01-17
Attending: FAMILY MEDICINE
Payer: COMMERCIAL

## 2025-01-14 VITALS — WEIGHT: 158 LBS | HEIGHT: 64 IN | BODY MASS INDEX: 26.98 KG/M2

## 2025-01-14 DIAGNOSIS — Z12.31 SCREENING MAMMOGRAM, ENCOUNTER FOR: ICD-10-CM

## 2025-01-14 PROCEDURE — 77063 BREAST TOMOSYNTHESIS BI: CPT

## (undated) DEVICE — ELECTRODE PT RET AD L9FT HI MOIST COND ADH HYDRGEL CORDED

## (undated) DEVICE — ENDOSCOPIC KIT 1.1+ OP4 CA DE 2 GWN AAMI LEVEL 3

## (undated) DEVICE — CONTAINER FORMALIN PREFILLED 10% NBF 60ML

## (undated) DEVICE — AIRLIFE™ OXYGEN TUBING 7 FEET (2.1 M) CRUSH RESISTANT OXYGEN TUBING, VINYL TIPPED: Brand: AIRLIFE™

## (undated) DEVICE — SINGLE PORT MANIFOLD: Brand: NEPTUNE 2

## (undated) DEVICE — SYRINGE MED 3ML CLR PLAS STD N CTRL LUERLOCK TIP DISP

## (undated) DEVICE — SYRINGE MED 10ML LUERLOCK TIP W/O SFTY DISP

## (undated) DEVICE — YANKAUER,BULB TIP,W/O VENT,RIGID,STERILE: Brand: MEDLINE

## (undated) DEVICE — NEEDLE SYR 18GA L1.5IN RED PLAS HUB S STL BLNT FILL W/O

## (undated) DEVICE — KENDALL RADIOLUCENT FOAM MONITORING ELECTRODE RECTANGULAR SHAPE: Brand: KENDALL

## (undated) DEVICE — LUBE JELLY FOIL PACK 1.4 OZ: Brand: MEDLINE INDUSTRIES, INC.

## (undated) DEVICE — CONNECTOR TBNG OD5-7MM O2 END DISP

## (undated) DEVICE — FORCEPS BX L L240CM DIA2.4MM RAD JAW 4 HOT FOR POLYP DISP

## (undated) DEVICE — SYRINGE, LUER SLIP, STERILE, 60ML: Brand: MEDLINE

## (undated) DEVICE — SNARE POLYP SM W13MMXL240CM SHTH DIA2.4MM OVL FLX DISP

## (undated) DEVICE — CANNULA NSL ORAL AD FOR CAPNOFLEX CO2 O2 AIRLFE

## (undated) DEVICE — GAUZE,SPONGE,4"X4",12PLY,WOVEN,NS,LF: Brand: MEDLINE